# Patient Record
Sex: FEMALE | Race: BLACK OR AFRICAN AMERICAN | NOT HISPANIC OR LATINO | Employment: STUDENT | URBAN - METROPOLITAN AREA
[De-identification: names, ages, dates, MRNs, and addresses within clinical notes are randomized per-mention and may not be internally consistent; named-entity substitution may affect disease eponyms.]

---
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
## 2024-05-01 ENCOUNTER — HOSPITAL ENCOUNTER (EMERGENCY)
Facility: HOSPITAL | Age: 19
Discharge: HOME | End: 2024-05-01
Attending: EMERGENCY MEDICINE
Payer: MEDICAID

## 2024-05-01 VITALS
OXYGEN SATURATION: 100 % | HEIGHT: 62 IN | TEMPERATURE: 99 F | HEART RATE: 93 BPM | BODY MASS INDEX: 28.52 KG/M2 | DIASTOLIC BLOOD PRESSURE: 93 MMHG | WEIGHT: 155 LBS | SYSTOLIC BLOOD PRESSURE: 147 MMHG | RESPIRATION RATE: 16 BRPM

## 2024-05-01 DIAGNOSIS — S91.332A PUNCTURE WOUND OF LEFT FOOT, INITIAL ENCOUNTER: Primary | ICD-10-CM

## 2024-05-01 LAB
B-HCG UR QL: NEGATIVE
CTP QC/QA: YES

## 2024-05-01 PROCEDURE — 99283 EMERGENCY DEPT VISIT LOW MDM: CPT

## 2024-05-01 PROCEDURE — 81025 URINE PREGNANCY TEST: CPT

## 2024-05-01 RX ORDER — SULFAMETHOXAZOLE AND TRIMETHOPRIM 800; 160 MG/1; MG/1
1 TABLET ORAL 2 TIMES DAILY
Qty: 10 TABLET | Refills: 0 | Status: SHIPPED | OUTPATIENT
Start: 2024-05-01 | End: 2024-05-06

## 2024-05-01 RX ORDER — ACETAMINOPHEN 500 MG
1000 TABLET ORAL EVERY 6 HOURS PRN
Qty: 28 TABLET | Refills: 0 | Status: SHIPPED | OUTPATIENT
Start: 2024-05-01

## 2024-05-01 NOTE — DISCHARGE INSTRUCTIONS

## 2024-05-01 NOTE — ED PROVIDER NOTES
Encounter Date: 5/1/2024       History     Chief Complaint   Patient presents with    Foot Pain     Pt stated she step on a stick pin yesterday and is now having pain to her left foot. Pt stated it went deep into her foot and someone pulled it out.     Ana Sinha is a 18-year-old female with no pertinent past medical history who presents to the emergency department with a chief complaint of puncture wound to the left foot.  Yesterday, she was helping her boyfriend grandmother clean, and when she was walking through the yd, she stepped on a needle.  After clarification, the patient described a sewing pin.  This object was not a hypodermic needle.  Patient reporting mild pain with ambulation.  Denies any swelling, erythema, or warmth.  No drainage.  Per chart review, last tetanus 2022.     The history is provided by the patient. No  was used.     Review of patient's allergies indicates:   Allergen Reactions    Nut - unspecified Anaphylaxis    Peanut Anaphylaxis    Iodine     Shrimp      Past Medical History:   Diagnosis Date    Anxiety disorder, unspecified     Asthma     Bronchitis      Past Surgical History:   Procedure Laterality Date    orif leg       Family History   Problem Relation Name Age of Onset    No Known Problems Mother      Hypertension Father       Social History     Tobacco Use    Smoking status: Never     Passive exposure: Yes    Smokeless tobacco: Never   Substance Use Topics    Alcohol use: No    Drug use: No     Review of Systems   Constitutional:  Negative for chills and fever.   HENT:  Negative for sore throat.    Respiratory:  Negative for shortness of breath.    Cardiovascular:  Negative for chest pain.   Gastrointestinal:  Negative for nausea.   Genitourinary:  Negative for dysuria.   Musculoskeletal:  Positive for arthralgias (Left foot). Negative for back pain and joint swelling.   Skin:  Positive for wound (Puncture puncture wound, left foot). Negative for rash.    Neurological:  Negative for weakness.   Hematological:  Does not bruise/bleed easily.       Physical Exam     Initial Vitals [05/01/24 1802]   BP Pulse Resp Temp SpO2   (!) 147/93 93 16 99.4 °F (37.4 °C) 100 %      MAP       --         Physical Exam    Nursing note and vitals reviewed.  Constitutional: Vital signs are normal. She appears well-developed and well-nourished. She is cooperative. She does not appear ill. No distress.   HENT:   Head: Normocephalic and atraumatic.   Right Ear: Hearing and external ear normal.   Left Ear: Hearing and external ear normal.   Nose: Nose normal.   Eyes: Conjunctivae and EOM are normal.   Neck: Phonation normal.   Normal range of motion.  Cardiovascular:  Normal rate and regular rhythm.           No murmur heard.  Pulmonary/Chest: Effort normal. No respiratory distress.   Abdominal: Abdomen is soft. She exhibits no distension. There is no abdominal tenderness.   Musculoskeletal:      Cervical back: Normal range of motion.        Feet:       Comments: Tiny puncture wound noted to the sole of the left foot.  No significant tenderness to palpation, erythema, warmth, drainage.  Neurovascularly intact distal.     Neurological: She is alert and oriented to person, place, and time. GCS eye subscore is 4. GCS verbal subscore is 5. GCS motor subscore is 6.   Skin: Skin is warm. Capillary refill takes less than 2 seconds.         ED Course   Procedures  Labs Reviewed   POCT URINE PREGNANCY          Imaging Results    None          Medications - No data to display  Medical Decision Making  18-year-old female presenting to the emergency department with a chief complaint of puncture wound to the left foot.  Sustained by a sewing pain in her boyfriend's grandmother's yd.  Still ambulatory.  Denies fever, systemic symptoms, erythema, warmth, drainage.  On physical exam, clinically well-appearing and in no acute distress.  Vitals within acceptable ranges.  Tiny puncture wound to the sole of  the left foot with no signs of localized infection.  Neurovascularly intact distal.  Minimally tender to palpation.    Differential diagnosis includes but is not limited to puncture wound with or without muscle, tendon, ligament, bone, or neurovascular damage, foreign body, or surrounding soft tissue infection such as cellulitis or erysipelas.    Puncture wound was sustained yesterday, it was mostly healed over at this time.  Patient states she cleans it at home, I do not believe it would be able to effectively irrigate it.  I will start her on a short course of Bactrim.  Her tetanus is up-to-date.  She was stable for discharge home at this time.  Motrin and Tylenol for pain control at home.    Return precautions were discussed, all patient questions were answered, and the patient was agreeable to the plan of care.  She was discharged home in stable condition and will follow up with her primary care provider or return to the emergency department if her symptoms worsen or do not improve.     Amount and/or Complexity of Data Reviewed  Labs: ordered. Decision-making details documented in ED Course.    Risk  OTC drugs.  Prescription drug management.  Diagnosis or treatment significantly limited by social determinants of health.                                      Clinical Impression:  Final diagnoses:  [S91.332A] Puncture wound of left foot, initial encounter (Primary)          ED Disposition Condition    Discharge Stable          ED Prescriptions       Medication Sig Dispense Start Date End Date Auth. Provider    sulfamethoxazole-trimethoprim 800-160mg (BACTRIM DS) 800-160 mg Tab Take 1 tablet by mouth 2 (two) times daily. for 5 days 10 tablet 5/1/2024 5/6/2024 Magen Asher, CHIVO    acetaminophen (TYLENOL) 500 MG tablet Take 2 tablets (1,000 mg total) by mouth every 6 (six) hours as needed. 28 tablet 5/1/2024 -- Magen Asher, CHIVO          Follow-up Information       Follow up With Specialties Details Why  Contact Info    Yuniel Reid MD Neonatology Schedule an appointment as soon as possible for a visit  As needed, If symptoms worsen [address] [place] [address]  [phone]               Magen Asher, PA-C  05/01/24 8278

## 2024-06-24 ENCOUNTER — HOSPITAL ENCOUNTER (EMERGENCY)
Facility: HOSPITAL | Age: 19
Discharge: HOME | End: 2024-06-24
Attending: EMERGENCY MEDICINE
Payer: MEDICAID

## 2024-06-24 VITALS
SYSTOLIC BLOOD PRESSURE: 122 MMHG | OXYGEN SATURATION: 100 % | DIASTOLIC BLOOD PRESSURE: 89 MMHG | BODY MASS INDEX: 27.6 KG/M2 | WEIGHT: 150 LBS | TEMPERATURE: 99 F | HEIGHT: 62 IN | HEART RATE: 79 BPM | RESPIRATION RATE: 18 BRPM

## 2024-06-24 DIAGNOSIS — L73.2 HIDRADENITIS SUPPURATIVA: Primary | ICD-10-CM

## 2024-06-24 LAB
B-HCG UR QL: NEGATIVE
CTP QC/QA: YES

## 2024-06-24 PROCEDURE — 96372 THER/PROPH/DIAG INJ SC/IM: CPT

## 2024-06-24 PROCEDURE — 25000003 PHARM REV CODE 250

## 2024-06-24 PROCEDURE — 63600175 PHARM REV CODE 636 W HCPCS

## 2024-06-24 PROCEDURE — 81025 URINE PREGNANCY TEST: CPT | Performed by: NURSE PRACTITIONER

## 2024-06-24 PROCEDURE — 25000003 PHARM REV CODE 250: Performed by: NURSE PRACTITIONER

## 2024-06-24 PROCEDURE — 10060 I&D ABSCESS SIMPLE/SINGLE: CPT

## 2024-06-24 PROCEDURE — 99284 EMERGENCY DEPT VISIT MOD MDM: CPT | Mod: 25

## 2024-06-24 RX ORDER — ACETAMINOPHEN 500 MG
1000 TABLET ORAL EVERY 6 HOURS PRN
Qty: 28 TABLET | Refills: 0 | Status: SHIPPED | OUTPATIENT
Start: 2024-06-24

## 2024-06-24 RX ORDER — MUPIROCIN 20 MG/G
1 OINTMENT TOPICAL
Status: COMPLETED | OUTPATIENT
Start: 2024-06-24 | End: 2024-06-24

## 2024-06-24 RX ORDER — MUPIROCIN 20 MG/G
OINTMENT TOPICAL 3 TIMES DAILY
Qty: 22 G | Refills: 0 | Status: SHIPPED | OUTPATIENT
Start: 2024-06-24

## 2024-06-24 RX ORDER — IBUPROFEN 800 MG/1
800 TABLET ORAL EVERY 6 HOURS PRN
Qty: 20 TABLET | Refills: 0 | Status: SHIPPED | OUTPATIENT
Start: 2024-06-24

## 2024-06-24 RX ORDER — KETOROLAC TROMETHAMINE 30 MG/ML
30 INJECTION, SOLUTION INTRAMUSCULAR; INTRAVENOUS
Status: COMPLETED | OUTPATIENT
Start: 2024-06-24 | End: 2024-06-24

## 2024-06-24 RX ORDER — SULFAMETHOXAZOLE AND TRIMETHOPRIM 800; 160 MG/1; MG/1
1 TABLET ORAL 2 TIMES DAILY
Qty: 14 TABLET | Refills: 0 | Status: SHIPPED | OUTPATIENT
Start: 2024-06-24 | End: 2024-07-01

## 2024-06-24 RX ORDER — LIDOCAINE HYDROCHLORIDE 10 MG/ML
10 INJECTION INFILTRATION; PERINEURAL
Status: COMPLETED | OUTPATIENT
Start: 2024-06-24 | End: 2024-06-24

## 2024-06-24 RX ADMIN — LIDOCAINE HYDROCHLORIDE 10 ML: 10 INJECTION, SOLUTION INFILTRATION; PERINEURAL at 04:06

## 2024-06-24 RX ADMIN — KETOROLAC TROMETHAMINE 30 MG: 30 INJECTION, SOLUTION INTRAMUSCULAR at 06:06

## 2024-06-24 RX ADMIN — MUPIROCIN 1 TUBE: 20 OINTMENT TOPICAL at 06:06

## 2024-06-24 NOTE — DISCHARGE INSTRUCTIONS

## 2024-06-24 NOTE — MEDICAL/APP STUDENT
"  History     Chief Complaint   Patient presents with    Abscess     Pt c/o "abscess" to right inner thigh x 3-4 days.  Denies drainage.  +swelling and pain, redness unknown.  Denies fever.      19 y/o F with Fhx of HS presents with worsening painful abscess without drainage on her right inner thigh x3 days. She has history of abscesses in the past which were treated with incision and drainage, antibiotics and pain medications. Patient denies fever, chills, N/V/D.     The history is provided by the patient. No  was used.       Past Medical History:   Diagnosis Date    Anxiety disorder, unspecified     Asthma     Bronchitis        Past Surgical History:   Procedure Laterality Date    orif leg         Family History   Problem Relation Name Age of Onset    No Known Problems Mother      Hypertension Father         Social History     Tobacco Use    Smoking status: Never     Passive exposure: Yes    Smokeless tobacco: Never   Substance Use Topics    Alcohol use: No    Drug use: No       Review of Systems   Constitutional:  Positive for fatigue. Negative for chills and fever.   HENT:  Negative for congestion, rhinorrhea and sore throat.    Respiratory:  Negative for cough, chest tightness and shortness of breath.    Cardiovascular:  Negative for chest pain and palpitations.   Gastrointestinal:  Negative for abdominal pain, constipation, diarrhea, nausea and vomiting.   Genitourinary:  Negative for dysuria.   Skin:  Positive for rash (groin) and wound (abscess).   Neurological:  Negative for dizziness, light-headedness and headaches.       Physical Exam   /60 (BP Location: Left arm, Patient Position: Sitting)   Pulse 76   Temp 99.5 °F (37.5 °C) (Oral)   Resp 18   Ht 5' 2" (1.575 m)   Wt 68 kg (150 lb)   SpO2 99%   Breastfeeding No   BMI 27.44 kg/m²     Physical Exam    Vitals reviewed.  Constitutional: She appears well-developed and well-nourished. No distress.   Cardiovascular:  Normal " rate, regular rhythm and normal heart sounds.     Exam reveals no gallop and no friction rub.       No murmur heard.  Pulmonary/Chest: Breath sounds normal. No respiratory distress. She has no wheezes. She has no rhonchi. She has no rales.   Abdominal: Abdomen is soft. Bowel sounds are normal. She exhibits no distension. There is no abdominal tenderness. There is no rebound and no guarding.   Genitourinary:    Genitourinary Comments: Abscess locations noted above           Neurological: She is alert.   Skin: Skin is warm and dry. Rash (groin) and abscess (x2) noted.         ED Course     MDM  17 y/o F with Fhx of HS presents with worsening painful abscess without drainage on her right inner thigh x3 days. She has history of abscesses in the past which were treated with incision and drainage, antibiotics and pain medications. She has x2 abscesses with central punctum on her right inner thigh. I&D performed for treatment of abscesses. Patient prescribed bactrim and topical mupirocin to prevent recurrence. Tylenol and ibuprofen sent out for pain control.       Ddx  Abscess, hidradenitis suppurativa, dejuan's gangrene, cellulitis

## 2024-06-24 NOTE — Clinical Note
"Ana Quevedomeghan Sinha was seen and treated in our emergency department on 6/24/2024.  She may return to work on 06/28/2024.       If you have any questions or concerns, please don't hesitate to call.      Magen Asher, CHIVO"

## 2024-06-25 PROCEDURE — 10060 I&D ABSCESS SIMPLE/SINGLE: CPT

## 2024-07-09 ENCOUNTER — OFFICE VISIT (OUTPATIENT)
Dept: SURGERY | Facility: CLINIC | Age: 19
End: 2024-07-09
Payer: MEDICAID

## 2024-07-09 VITALS
WEIGHT: 159.75 LBS | HEART RATE: 92 BPM | SYSTOLIC BLOOD PRESSURE: 138 MMHG | BODY MASS INDEX: 29.4 KG/M2 | DIASTOLIC BLOOD PRESSURE: 76 MMHG | HEIGHT: 62 IN

## 2024-07-09 DIAGNOSIS — L73.2 HIDRADENITIS SUPPURATIVA: Primary | ICD-10-CM

## 2024-07-09 PROCEDURE — 99204 OFFICE O/P NEW MOD 45 MIN: CPT | Mod: S$PBB,,, | Performed by: SURGERY

## 2024-07-09 PROCEDURE — 99999 PR PBB SHADOW E&M-EST. PATIENT-LVL IV: CPT | Mod: PBBFAC,,, | Performed by: SURGERY

## 2024-07-09 PROCEDURE — 3078F DIAST BP <80 MM HG: CPT | Mod: CPTII,,, | Performed by: SURGERY

## 2024-07-09 PROCEDURE — 1159F MED LIST DOCD IN RCRD: CPT | Mod: CPTII,,, | Performed by: SURGERY

## 2024-07-09 PROCEDURE — 3008F BODY MASS INDEX DOCD: CPT | Mod: CPTII,,, | Performed by: SURGERY

## 2024-07-09 PROCEDURE — 99214 OFFICE O/P EST MOD 30 MIN: CPT | Mod: PBBFAC | Performed by: SURGERY

## 2024-07-09 PROCEDURE — 3075F SYST BP GE 130 - 139MM HG: CPT | Mod: CPTII,,, | Performed by: SURGERY

## 2024-07-09 RX ORDER — DOXYCYCLINE 100 MG/1
100 CAPSULE ORAL EVERY 12 HOURS
Qty: 180 CAPSULE | Refills: 3 | Status: SHIPPED | OUTPATIENT
Start: 2024-07-09

## 2024-07-09 NOTE — PROGRESS NOTES
19 y/o woman with history of axillary and inguinal hydradenitis.    Discussed surgery and other treatment options including biologics    Will order suppressive doxy and refer to derm.

## 2024-07-10 ENCOUNTER — LAB VISIT (OUTPATIENT)
Dept: LAB | Facility: HOSPITAL | Age: 19
End: 2024-07-10
Attending: FAMILY MEDICINE
Payer: MEDICAID

## 2024-07-10 ENCOUNTER — OFFICE VISIT (OUTPATIENT)
Facility: CLINIC | Age: 19
End: 2024-07-10
Payer: MEDICAID

## 2024-07-10 VITALS
BODY MASS INDEX: 28.8 KG/M2 | TEMPERATURE: 98 F | WEIGHT: 156.5 LBS | SYSTOLIC BLOOD PRESSURE: 102 MMHG | RESPIRATION RATE: 18 BRPM | HEIGHT: 62 IN | OXYGEN SATURATION: 100 % | HEART RATE: 73 BPM | DIASTOLIC BLOOD PRESSURE: 64 MMHG

## 2024-07-10 DIAGNOSIS — Z02.0 ENCOUNTER FOR EXAMINATION FOR ADMISSION TO EDUCATIONAL INSTITUTION: ICD-10-CM

## 2024-07-10 DIAGNOSIS — Z02.0 ENCOUNTER FOR EXAMINATION FOR ADMISSION TO EDUCATIONAL INSTITUTION: Primary | ICD-10-CM

## 2024-07-10 LAB
B-HCG UR QL: NEGATIVE
CTP QC/QA: YES

## 2024-07-10 PROCEDURE — 86706 HEP B SURFACE ANTIBODY: CPT | Mod: 91 | Performed by: FAMILY MEDICINE

## 2024-07-10 PROCEDURE — 86735 MUMPS ANTIBODY: CPT | Performed by: FAMILY MEDICINE

## 2024-07-10 PROCEDURE — 99999 PR PBB SHADOW E&M-EST. PATIENT-LVL IV: CPT | Mod: PBBFAC,,, | Performed by: FAMILY MEDICINE

## 2024-07-10 PROCEDURE — 1159F MED LIST DOCD IN RCRD: CPT | Mod: CPTII,,, | Performed by: FAMILY MEDICINE

## 2024-07-10 PROCEDURE — 90620 MENB-4C VACCINE IM: CPT | Mod: PBBFAC,PN

## 2024-07-10 PROCEDURE — 86787 VARICELLA-ZOSTER ANTIBODY: CPT | Performed by: FAMILY MEDICINE

## 2024-07-10 PROCEDURE — 99999PBSHW POCT URINE PREGNANCY: Mod: PBBFAC,,,

## 2024-07-10 PROCEDURE — 81025 URINE PREGNANCY TEST: CPT | Mod: PBBFAC,PN | Performed by: FAMILY MEDICINE

## 2024-07-10 PROCEDURE — 99214 OFFICE O/P EST MOD 30 MIN: CPT | Mod: PBBFAC,PN,25 | Performed by: FAMILY MEDICINE

## 2024-07-10 PROCEDURE — 99999PBSHW PR PBB SHADOW TECHNICAL ONLY FILED TO HB: Mod: PBBFAC,,,

## 2024-07-10 PROCEDURE — 86765 RUBEOLA ANTIBODY: CPT | Performed by: FAMILY MEDICINE

## 2024-07-10 PROCEDURE — 3078F DIAST BP <80 MM HG: CPT | Mod: CPTII,,, | Performed by: FAMILY MEDICINE

## 2024-07-10 PROCEDURE — 1160F RVW MEDS BY RX/DR IN RCRD: CPT | Mod: CPTII,,, | Performed by: FAMILY MEDICINE

## 2024-07-10 PROCEDURE — 90471 IMMUNIZATION ADMIN: CPT | Mod: PBBFAC,PN

## 2024-07-10 PROCEDURE — 3008F BODY MASS INDEX DOCD: CPT | Mod: CPTII,,, | Performed by: FAMILY MEDICINE

## 2024-07-10 PROCEDURE — 99385 PREV VISIT NEW AGE 18-39: CPT | Mod: S$PBB,,, | Performed by: FAMILY MEDICINE

## 2024-07-10 PROCEDURE — 36415 COLL VENOUS BLD VENIPUNCTURE: CPT | Performed by: FAMILY MEDICINE

## 2024-07-10 PROCEDURE — 86762 RUBELLA ANTIBODY: CPT | Performed by: FAMILY MEDICINE

## 2024-07-10 PROCEDURE — 3074F SYST BP LT 130 MM HG: CPT | Mod: CPTII,,, | Performed by: FAMILY MEDICINE

## 2024-07-10 RX ADMIN — NEISSERIA MENINGITIDIS SEROGROUP B NHBA FUSION PROTEIN ANTIGEN, NEISSERIA MENINGITIDIS SEROGROUP B FHBP FUSION PROTEIN ANTIGEN AND NEISSERIA MENINGITIDIS SEROGROUP B NADA PROTEIN ANTIGEN 0.5 ML: 50; 50; 50; 25 INJECTION, SUSPENSION INTRAMUSCULAR at 03:07

## 2024-07-10 NOTE — PROGRESS NOTES
Subjective     Patient ID: Ana Sinha is a 18 y.o. female.    Chief Complaint: Establish Care (Pt stated she is coming in today for a physical for school and to establish care with a PCP)    HPI:  Patient is a 18 year old female with anxiety here for physical for nursing school and to establish care.   Review of Systems   All other systems reviewed and are negative.         Objective     Physical Exam  Constitutional:       Appearance: Normal appearance. She is well-developed.   HENT:      Head: Normocephalic and atraumatic.      Right Ear: External ear normal.      Left Ear: External ear normal.      Nose: Nose normal.   Eyes:      General: No scleral icterus.     Conjunctiva/sclera: Conjunctivae normal.      Pupils: Pupils are equal, round, and reactive to light.   Neck:      Thyroid: No thyromegaly.   Cardiovascular:      Rate and Rhythm: Normal rate and regular rhythm.      Heart sounds: No murmur heard.     No friction rub. No gallop.   Pulmonary:      Effort: Pulmonary effort is normal.      Breath sounds: Normal breath sounds. No wheezing or rales.   Abdominal:      General: Bowel sounds are normal. There is no distension.      Palpations: Abdomen is soft. There is no mass.      Tenderness: There is no abdominal tenderness.   Musculoskeletal:         General: No swelling, tenderness or deformity.      Cervical back: Normal range of motion and neck supple.      Right lower leg: No edema.      Left lower leg: No edema.   Lymphadenopathy:      Cervical: No cervical adenopathy.      Upper Body:      Right upper body: No supraclavicular adenopathy.      Left upper body: No supraclavicular adenopathy.   Skin:     General: Skin is warm and dry.      Findings: No rash.   Neurological:      Mental Status: She is alert and oriented to person, place, and time.      Gait: Gait normal.      Deep Tendon Reflexes: Reflexes normal.   Psychiatric:         Mood and Affect: Mood normal.         Behavior: Behavior  normal.          Assessment and Plan     1. Encounter for examination for admission to [facility]  Normal exam.  Will obtain titers and administer meningococcal vaccine  -     VARICELLA ZOSTER ANTIBODY, IGG; Future; Expected date: 07/10/2024  -     RUBELLA ANTIBODY, IGG; Future; Expected date: 07/10/2024  -     MUMPS ANTIBODY, IGG; Future; Expected date: 07/10/2024  -     RUBEOLA ANTIBODY IGG; Future; Expected date: 07/10/2024  -     Hepatitis B Surface Ab, Qualitative; Future; Expected date: 07/10/2024  -     QUANTIFERON GOLD TB; Future; Expected date: 07/10/2024  -     meningococcal group B vaccine (PF) injection 0.5 mL               No follow-ups on file.

## 2024-07-11 ENCOUNTER — LAB VISIT (OUTPATIENT)
Dept: LAB | Facility: HOSPITAL | Age: 19
End: 2024-07-11
Attending: FAMILY MEDICINE
Payer: MEDICAID

## 2024-07-11 DIAGNOSIS — Z02.0 ENCOUNTER FOR EXAMINATION FOR ADMISSION TO EDUCATIONAL INSTITUTION: ICD-10-CM

## 2024-07-11 LAB
HBV SURFACE AB SER-ACNC: <3 MIU/ML
HBV SURFACE AB SER-ACNC: NORMAL M[IU]/ML

## 2024-07-11 PROCEDURE — 86480 TB TEST CELL IMMUN MEASURE: CPT | Performed by: FAMILY MEDICINE

## 2024-07-12 LAB
GAMMA INTERFERON BACKGROUND BLD IA-ACNC: 0 IU/ML
M TB IFN-G CD4+ BCKGRND COR BLD-ACNC: 0 IU/ML
M TB IFN-G CD4+ BCKGRND COR BLD-ACNC: 0.03 IU/ML
MITOGEN IGNF BCKGRD COR BLD-ACNC: 10 IU/ML
RUBV IGG SER-ACNC: 20.6 IU/ML
RUBV IGG SER-IMP: REACTIVE
TB GOLD PLUS: NEGATIVE

## 2024-07-13 ENCOUNTER — HOSPITAL ENCOUNTER (EMERGENCY)
Facility: HOSPITAL | Age: 19
Discharge: HOME | End: 2024-07-13
Attending: EMERGENCY MEDICINE
Payer: MEDICAID

## 2024-07-13 VITALS
HEART RATE: 91 BPM | OXYGEN SATURATION: 99 % | TEMPERATURE: 98 F | HEIGHT: 62 IN | WEIGHT: 158 LBS | RESPIRATION RATE: 20 BRPM | SYSTOLIC BLOOD PRESSURE: 120 MMHG | BODY MASS INDEX: 29.08 KG/M2 | DIASTOLIC BLOOD PRESSURE: 79 MMHG

## 2024-07-13 DIAGNOSIS — Z87.2 HISTORY OF HIDRADENITIS SUPPURATIVA: ICD-10-CM

## 2024-07-13 DIAGNOSIS — N75.1 BARTHOLIN'S GLAND ABSCESS: Primary | ICD-10-CM

## 2024-07-13 LAB
B-HCG UR QL: NEGATIVE
BILIRUB UR QL STRIP: NEGATIVE
CLARITY UR: CLEAR
COLOR UR: YELLOW
CTP QC/QA: YES
GLUCOSE UR QL STRIP: NEGATIVE
HGB UR QL STRIP: NEGATIVE
KETONES UR QL STRIP: ABNORMAL
LEUKOCYTE ESTERASE UR QL STRIP: NEGATIVE
MUMPS IGG INTERPRETATION: POSITIVE
MUMPS IGG SCREEN: 211 AU/ML
NITRITE UR QL STRIP: NEGATIVE
PH UR STRIP: 7 [PH] (ref 5–8)
PROT UR QL STRIP: ABNORMAL
RUBEOLA IGG ANTIBODY: >300 AU/ML
RUBEOLA INTERPRETATION: POSITIVE
SP GR UR STRIP: >1.03 (ref 1–1.03)
URN SPEC COLLECT METH UR: ABNORMAL
UROBILINOGEN UR STRIP-ACNC: ABNORMAL EU/DL
VARICELLA INTERPRETATION: POSITIVE
VARICELLA ZOSTER IGG: 545.2

## 2024-07-13 PROCEDURE — 25000003 PHARM REV CODE 250

## 2024-07-13 PROCEDURE — 56420 I&D BARTHOLINS GLAND ABSCESS: CPT

## 2024-07-13 PROCEDURE — 99283 EMERGENCY DEPT VISIT LOW MDM: CPT

## 2024-07-13 PROCEDURE — 81003 URINALYSIS AUTO W/O SCOPE: CPT

## 2024-07-13 PROCEDURE — 81025 URINE PREGNANCY TEST: CPT | Performed by: EMERGENCY MEDICINE

## 2024-07-13 PROCEDURE — 87591 N.GONORRHOEAE DNA AMP PROB: CPT

## 2024-07-13 PROCEDURE — 87491 CHLMYD TRACH DNA AMP PROBE: CPT

## 2024-07-13 RX ORDER — NAPROXEN 500 MG/1
500 TABLET ORAL 2 TIMES DAILY
Qty: 10 TABLET | Refills: 0 | Status: SHIPPED | OUTPATIENT
Start: 2024-07-13 | End: 2024-07-18

## 2024-07-13 RX ORDER — ONDANSETRON 4 MG/1
4 TABLET, ORALLY DISINTEGRATING ORAL
Status: COMPLETED | OUTPATIENT
Start: 2024-07-13 | End: 2024-07-13

## 2024-07-13 RX ORDER — OXYCODONE AND ACETAMINOPHEN 5; 325 MG/1; MG/1
1 TABLET ORAL
Status: COMPLETED | OUTPATIENT
Start: 2024-07-13 | End: 2024-07-13

## 2024-07-13 RX ORDER — LIDOCAINE HYDROCHLORIDE 10 MG/ML
100 INJECTION INFILTRATION; PERINEURAL
Status: COMPLETED | OUTPATIENT
Start: 2024-07-13 | End: 2024-07-13

## 2024-07-13 RX ADMIN — ONDANSETRON 4 MG: 4 TABLET, ORALLY DISINTEGRATING ORAL at 02:07

## 2024-07-13 RX ADMIN — LIDOCAINE HYDROCHLORIDE 10 MG: 10 INJECTION, SOLUTION INFILTRATION; PERINEURAL at 02:07

## 2024-07-13 RX ADMIN — OXYCODONE HYDROCHLORIDE AND ACETAMINOPHEN 1 TABLET: 5; 325 TABLET ORAL at 02:07

## 2024-07-13 NOTE — DISCHARGE INSTRUCTIONS
You were seen in the emergency department today for abscess.  Please take all medications as prescribed and as we discussed.  Follow-up with specialist if instructed to do so.  It is important to remember that some problems are difficult to diagnose and may not be found during your Emergency Department visit. Be sure to follow up with your primary care doctor and review all labs/imaging/tests that were performed during this visit with them. Some labs/tests may be outside of the normal range and require non-emergent follow-up and further investigation to help diagnose/exclude/prevent complications or other medical conditions. Return to the emergency department for any new or worsening symptoms. Thank you for allowing me to care for you today, it was my pleasure. I hope you get to feeling better soon!

## 2024-07-13 NOTE — ED NOTES
Pt c/o abscess to left upper inner thigh near buttocks.  Denies fever or drainage.  Pt is AAOx3, resp even and unlabored, skin warm and dry. Visitor at bedside.

## 2024-07-13 NOTE — ED PROVIDER NOTES
Encounter Date: 7/13/2024    SCRIBE #1 NOTE: I, Glory Smith, am scribing for, and in the presence of,  Mitchell Marvin PA-C. I have scribed the following portions of the note - Other sections scribed: HPI/ROS/PE.       History     Chief Complaint   Patient presents with    Abscess     Pt to ED reporting abscess in between buttock towards groin. Pt denies fevers. Pt is currently on antibiotics for another abscess that has healed.      Patient is a 18 y.o. female with a past medical history of anxiety, asthma, hidradenitis suppurativa who presents to the emergency department with painful left inguinal groin abscess x 3 days. Patient also reports dysuria. Pt was in ED 2 weeks ago with abscess, it was drained and pt discharged with Bactrim.   Pt reports abscess from 2 weeks ago has healed. The current abscess is the same location but opposite side. Pt notes seeing general surgery on Tuesday and prescribed doxycycline; current abscess wasn't present at this time. LMP 1 month ago. Denies abscess drainage, fever, nausea, vomiting, vaginal discharge or other associated symptoms.      The history is provided by the patient.     Review of patient's allergies indicates:   Allergen Reactions    Nut - unspecified Anaphylaxis    Peanut Anaphylaxis    Iodine     Shrimp      Past Medical History:   Diagnosis Date    Anxiety disorder, unspecified     Asthma     Bronchitis      Past Surgical History:   Procedure Laterality Date    orif leg       Family History   Problem Relation Name Age of Onset    No Known Problems Mother      Hypertension Father       Social History     Tobacco Use    Smoking status: Never     Passive exposure: Yes    Smokeless tobacco: Never   Substance Use Topics    Alcohol use: No    Drug use: No     Review of Systems   Constitutional:  Negative for chills and fever.   Respiratory:  Negative for shortness of breath.    Cardiovascular:  Negative for chest pain.   Gastrointestinal:  Negative for abdominal pain,  nausea and vomiting.   Genitourinary:  Positive for dysuria. Negative for flank pain, frequency, hematuria, vaginal bleeding and vaginal discharge.        Positive for left inguinal groin abscess, no drainage.   Musculoskeletal:  Negative for back pain, neck pain and neck stiffness.   Neurological:  Negative for dizziness, light-headedness and headaches.       Physical Exam     Initial Vitals [07/13/24 1244]   BP Pulse Resp Temp SpO2   120/79 91 18 98.2 °F (36.8 °C) 99 %      MAP       --         Physical Exam    Nursing note and vitals reviewed.  Constitutional: She appears well-developed and well-nourished.   HENT:   Head: Normocephalic and atraumatic.   Right Ear: External ear normal.   Left Ear: External ear normal.   Neck: Carotid bruit is not present.   Normal range of motion.  Cardiovascular:  Normal rate, regular rhythm, normal heart sounds and intact distal pulses.     Exam reveals no gallop and no friction rub.       No murmur heard.  Pulmonary/Chest: Breath sounds normal. No respiratory distress. She has no wheezes. She has no rhonchi. She has no rales.   Abdominal: Abdomen is soft. Bowel sounds are normal. She exhibits no distension. There is no abdominal tenderness. There is no rebound and no guarding.   Genitourinary:    Genitourinary Comments: There is an area of swelling to the left inferior labia with erythema fluctuance consistent with Bartholin's gland abscess.  No active drainage.  Well healed scar to right groin from previous abscess.     Musculoskeletal:         General: Normal range of motion.      Cervical back: Normal range of motion.     Neurological: She is alert and oriented to person, place, and time. GCS score is 15. GCS eye subscore is 4. GCS verbal subscore is 5. GCS motor subscore is 6.   Psychiatric: She has a normal mood and affect.         ED Course   I & D - Incision and Drainage    Date/Time: 7/13/2024 3:36 PM  Location procedure was performed: [facility] EMERGENCY  DEPARTMENT    Performed by: Mitchell Marvin PA-C  Authorized by: Thony Aviles MD  Consent Done: Yes  Consent given by: patient  Patient identity confirmed:  and verbally with patient  Type: abscess  Body area: anogenital  Location details: Bartholin's gland  Anesthesia: local infiltration    Anesthesia:  Local Anesthetic: lidocaine 1% without epinephrine  Anesthetic total: 3 mL  Scalpel size: 11  Incision type: single straight  Drainage: purulent  Drainage amount: moderate  Wound treatment: incision, drainage, expression of material and wound left open  Complications: No  Specimens: No  Implants: No  Patient tolerance: Patient tolerated the procedure well with no immediate complications        Labs Reviewed   URINALYSIS, REFLEX TO URINE CULTURE - Abnormal; Notable for the following components:       Result Value    Specific Gravity, UA >1.030 (*)     Protein, UA Trace (*)     Ketones, UA 2+ (*)     Urobilinogen, UA 2.0-3.0 (*)     All other components within normal limits    Narrative:     Specimen Source->Urine   C. TRACHOMATIS/N. GONORRHOEAE BY AMP DNA   POCT URINE PREGNANCY          Imaging Results    None          Medications   LIDOcaine HCL 10 mg/ml (1%) injection 100 mg (10 mg Intradermal Given 24 1422)   oxyCODONE-acetaminophen 5-325 mg per tablet 1 tablet (1 tablet Oral Given 24 1434)   ondansetron disintegrating tablet 4 mg (4 mg Oral Given 24 1434)     Medical Decision Making  This is an emergent evaluation of a 18 y.o. female with a past medical history of anxiety, asthma, hidradenitis suppurativa who presents to the emergency department for evaluation of possible abscess to left groin x 3 days.    Patient looks well clinically. There is an area of swelling to the left inferior labia with erythema fluctuance consistent with Bartholin's gland abscess.  No active drainage.  Well healed scar to right groin from previous abscess. Regular rate rhythm without murmurs.  No carotid bruits  appreciated on exam. Lungs are clear to auscultation bilaterally.  Abdomen is soft, nontender, non distended, with normal bowel sounds.     Differential diagnosis includes but is not limited to abscess, cellulitis, UTI, STI, other infection.    Workup initiated with urinalysis, GC, UPT.  Ordered lidocaine, I&D kit.  Vital signs, chart, labs, and/or imaging were all reviewed.  See ED course below and interpretations above. My overall impression is bothering gland abscess. Will discharge home with dermatology and General surgery follow-up.  Patient already on doxycycline. Patient is very well appearing, and in no acute distress. Vital signs are reassuring here in the emergency department, patient is afebrile, breathing comfortable, satting 99 % on room air. Patient/Caregiver is stable for discharge at this time.  Patient/Caregiver was informed of results and plan of care. Patient/Caregiver verbalized understanding of care plan. All questions and concerns were addressed. Discussed strict return precautions with the patient/caregiver. Instructed follow up with primary care provider within 1 week.      Mitchell Marvin PA-C    DISCLAIMER: This note was prepared with [employer] voice recognition transcription software. Garbled syntax, mangled pronouns, and other bizarre constructions may be attributed to that software system.       Amount and/or Complexity of Data Reviewed  Labs: ordered. Decision-making details documented in ED Course.    Risk  Prescription drug management.            Scribe Attestation:   Scribe #1: I performed the above scribed service and the documentation accurately describes the services I performed. I attest to the accuracy of the note.        ED Course as of 07/13/24 1537   Sat Jul 13, 2024   1402 POCT urine pregnancy  UPT negative. [TM]   1431 Patient tearful. Not able to get her to calm down for I&D. Ordered 1 percocet for pain and zofran to prevent nausea. [TM]   1511 Urinalysis, Reflex to Urine  Culture Urine, Clean Catch(!)  Urinalysis shows no signs of infection, negative for nitrites or leukocytes. [TM]   1511 Incision and drainage performed with moderate purulent drainage.  Patient tolerated procedure well.  Patient currently on doxycycline for HS.  She follows with general surgery.  Will have her follow-up.  Will also placed referral to Dermatology.  Will discharge home with naproxen for pain. [TM]      ED Course User Index  [TM] Mitchell Marvin PA-C                           Clinical Impression:  Final diagnoses:  [N75.1] Bartholin's gland abscess (Primary)  [Z87.2] History of hidradenitis suppurativa          ED Disposition Condition    Discharge Stable          ED Prescriptions       Medication Sig Dispense Start Date End Date Auth. Provider    naproxen (NAPROSYN) 500 MG tablet Take 1 tablet (500 mg total) by mouth 2 (two) times daily. for 5 days 10 tablet 7/13/2024 7/18/2024 Mitchell Marvin PA-C          Follow-up Information       Follow up With Specialties Details Why Contact [facility] Emergency Dept Emergency Medicine Go to  As needed, If symptoms worsen, or new symptoms develop [address] [facility] [place] [address] [phone]    Primary care doctor  Schedule an appointment as soon as possible for a visit in 3 days               Mitchell Marvin PA-C  07/13/24 7041

## 2024-07-13 NOTE — Clinical Note
"Ana"Minesh Sinha was seen and treated in our emergency department on 7/13/2024.  She may return to work on 07/16/2024.       If you have any questions or concerns, please don't hesitate to call.      Mitchell Marvin PA-C"

## 2024-07-17 LAB
C TRACH DNA SPEC QL NAA+PROBE: NOT DETECTED
N GONORRHOEA DNA SPEC QL NAA+PROBE: NOT DETECTED

## 2024-08-12 ENCOUNTER — HOSPITAL ENCOUNTER (EMERGENCY)
Facility: HOSPITAL | Age: 19
Discharge: HOME | End: 2024-08-12
Attending: EMERGENCY MEDICINE
Payer: MEDICAID

## 2024-08-12 VITALS
TEMPERATURE: 98 F | OXYGEN SATURATION: 99 % | DIASTOLIC BLOOD PRESSURE: 74 MMHG | HEIGHT: 62 IN | RESPIRATION RATE: 16 BRPM | BODY MASS INDEX: 27.6 KG/M2 | WEIGHT: 150 LBS | SYSTOLIC BLOOD PRESSURE: 125 MMHG | HEART RATE: 75 BPM

## 2024-08-12 DIAGNOSIS — N39.0 URINARY TRACT INFECTION WITH PYURIA: ICD-10-CM

## 2024-08-12 DIAGNOSIS — B37.31 VAGINAL CANDIDIASIS: ICD-10-CM

## 2024-08-12 DIAGNOSIS — N76.0 BV (BACTERIAL VAGINOSIS): Primary | ICD-10-CM

## 2024-08-12 DIAGNOSIS — B96.89 BV (BACTERIAL VAGINOSIS): Primary | ICD-10-CM

## 2024-08-12 LAB
B-HCG UR QL: NEGATIVE
BACTERIA #/AREA URNS AUTO: ABNORMAL /HPF
BACTERIA GENITAL QL WET PREP: ABNORMAL
BILIRUB UR QL STRIP: NEGATIVE
C TRACH DNA SPEC QL NAA+PROBE: NOT DETECTED
CLARITY UR REFRACT.AUTO: ABNORMAL
CLUE CELLS VAG QL WET PREP: ABNORMAL
COLOR UR AUTO: YELLOW
CTP QC/QA: YES
FILAMENT FUNGI VAG WET PREP-#/AREA: ABNORMAL
GLUCOSE UR QL STRIP: NEGATIVE
HGB UR QL STRIP: NEGATIVE
KETONES UR QL STRIP: ABNORMAL
LEUKOCYTE ESTERASE UR QL STRIP: ABNORMAL
MICROSCOPIC COMMENT: ABNORMAL
N GONORRHOEA DNA SPEC QL NAA+PROBE: NOT DETECTED
NITRITE UR QL STRIP: NEGATIVE
PH UR STRIP: 7 [PH] (ref 5–8)
PROT UR QL STRIP: ABNORMAL
RBC #/AREA URNS AUTO: 1 /HPF (ref 0–4)
SP GR UR STRIP: 1.02 (ref 1–1.03)
SPECIMEN SOURCE: ABNORMAL
T VAGINALIS GENITAL QL WET PREP: ABNORMAL
URN SPEC COLLECT METH UR: ABNORMAL
UROBILINOGEN UR STRIP-ACNC: NEGATIVE EU/DL
WBC #/AREA URNS AUTO: 30 /HPF (ref 0–5)
WBC #/AREA VAG WET PREP: ABNORMAL
WBC CLUMPS UR QL AUTO: ABNORMAL
YEAST GENITAL QL WET PREP: ABNORMAL
YEAST UR QL AUTO: ABNORMAL

## 2024-08-12 PROCEDURE — 87591 N.GONORRHOEAE DNA AMP PROB: CPT | Mod: ER

## 2024-08-12 PROCEDURE — 81025 URINE PREGNANCY TEST: CPT | Mod: ER

## 2024-08-12 PROCEDURE — 87147 CULTURE TYPE IMMUNOLOGIC: CPT | Mod: ER

## 2024-08-12 PROCEDURE — 87088 URINE BACTERIA CULTURE: CPT | Mod: ER

## 2024-08-12 PROCEDURE — 81000 URINALYSIS NONAUTO W/SCOPE: CPT | Mod: ER

## 2024-08-12 PROCEDURE — 87086 URINE CULTURE/COLONY COUNT: CPT | Mod: ER

## 2024-08-12 PROCEDURE — 87210 SMEAR WET MOUNT SALINE/INK: CPT | Mod: ER

## 2024-08-12 PROCEDURE — 99284 EMERGENCY DEPT VISIT MOD MDM: CPT | Mod: ER

## 2024-08-12 PROCEDURE — 87491 CHLMYD TRACH DNA AMP PROBE: CPT | Mod: ER

## 2024-08-12 RX ORDER — METRONIDAZOLE 500 MG/1
500 TABLET ORAL EVERY 12 HOURS
Qty: 14 TABLET | Refills: 0 | Status: SHIPPED | OUTPATIENT
Start: 2024-08-12 | End: 2024-08-19

## 2024-08-12 RX ORDER — FLUCONAZOLE 200 MG/1
200 TABLET ORAL ONCE
Qty: 3 TABLET | Refills: 0 | Status: SHIPPED | OUTPATIENT
Start: 2024-08-12 | End: 2024-08-12

## 2024-08-12 RX ORDER — CEPHALEXIN 500 MG/1
500 CAPSULE ORAL 4 TIMES DAILY
Qty: 20 CAPSULE | Refills: 0 | Status: SHIPPED | OUTPATIENT
Start: 2024-08-12 | End: 2024-08-17

## 2024-08-12 NOTE — ED PROVIDER NOTES
Encounter Date: 8/12/2024       History     Chief Complaint   Patient presents with    Vaginal Discharge     White discharge, itching, burning sensation, started 1 week ago      Ana Sinha is a 18 y.o. female who has a past medical history of Anxiety disorder, unspecified, Asthma, and Bronchitis. presenting to the Emergency Department for vaginal discharge and dysuria. She reports symptoms have been going on for about a week. The vaginal discharge is thick and white. She does have some itching and irritation. Also reports dysuria, but feels it is not consistent with UTIs she has had in the past. No suprapubic pressure or ferquency. No abdominal pain, fever, flank pain, fevers. She reports low suspicion for STD. She does take doxycycline daily for HS.         The history is provided by the patient.     Review of patient's allergies indicates:   Allergen Reactions    Nut - unspecified Anaphylaxis    Peanut Anaphylaxis    Iodine     Shrimp      Past Medical History:   Diagnosis Date    Anxiety disorder, unspecified     Asthma     Bronchitis      Past Surgical History:   Procedure Laterality Date    orif leg       Family History   Problem Relation Name Age of Onset    No Known Problems Mother      Hypertension Father       Social History     Tobacco Use    Smoking status: Never     Passive exposure: Yes    Smokeless tobacco: Never   Substance Use Topics    Alcohol use: No    Drug use: No     Review of Systems   Constitutional:  Negative for chills and fever.   Gastrointestinal:  Negative for nausea and vomiting.   Genitourinary:  Positive for dysuria and vaginal discharge. Negative for flank pain and vaginal bleeding.       Physical Exam     Initial Vitals [08/12/24 1122]   BP Pulse Resp Temp SpO2   125/74 75 16 98.3 °F (36.8 °C) 99 %      MAP       --         Physical Exam    Nursing note and vitals reviewed.  Constitutional: She appears well-developed and well-nourished. She is not diaphoretic.  Non-toxic  appearance. No distress.   HENT:   Head: Normocephalic and atraumatic.   Right Ear: Hearing and external ear normal.   Left Ear: Hearing and external ear normal.   Eyes: EOM are normal.   Neck: Neck supple.   Normal range of motion.  Cardiovascular:  Normal rate.           Pulmonary/Chest: No respiratory distress.   Abdominal: Abdomen is soft. She exhibits no distension. There is no abdominal tenderness.   No CVA tenderness There is no rebound.   Musculoskeletal:         General: Normal range of motion.      Cervical back: Normal range of motion and neck supple. Normal range of motion.     Neurological: She is alert and oriented to person, place, and time. GCS score is 15. GCS eye subscore is 4. GCS verbal subscore is 5. GCS motor subscore is 6.   Skin: Skin is warm and dry.   Psychiatric: She has a normal mood and affect. Her behavior is normal. Judgment and thought content normal.         ED Course   Procedures  Labs Reviewed   VAGINAL SCREEN - Abnormal       Result Value    Trichomonas None      Clue Cells Rare (*)     Budding Yeast Occasional (*)     Fungal Hyphae None      WBC - Vaginal Screen Few (*)     Bacteria - Vaginal Screen Moderate (*)     Wet Prep Source VAG      Narrative:     Specimen Source->Vagina  Release to patient->Immediate   URINALYSIS, REFLEX TO URINE CULTURE - Abnormal    Specimen UA Urine, Clean Catch      Color, UA Yellow      Appearance, UA Cloudy (*)     pH, UA 7.0      Specific Gravity, UA 1.020      Protein, UA Trace (*)     Glucose, UA Negative      Ketones, UA Trace (*)     Bilirubin (UA) Negative      Occult Blood UA Negative      Nitrite, UA Negative      Urobilinogen, UA Negative      Leukocytes, UA 2+ (*)     Narrative:     Preferred Collection Type->Urine, Clean Catch  Specimen Source->Urine   URINALYSIS MICROSCOPIC - Abnormal    RBC, UA 1      WBC, UA 30 (*)     WBC Clumps, UA Moderate (*)     Bacteria Moderate (*)     Yeast, UA Occasional (*)     Microscopic Comment SEE  COMMENT      Narrative:     Preferred Collection Type->Urine, Clean Catch  Specimen Source->Urine   CULTURE, URINE   C. TRACHOMATIS/N. GONORRHOEAE BY AMP DNA   POCT URINE PREGNANCY    POC Preg Test, Ur Negative       Acceptable Yes            Imaging Results    None          Medications - No data to display  Medical Decision Making  18-year-old female presents with vaginal discharge and dysuria for 1 week. No fever, flank pain, abdominal pain. Low concern for STD    Differential Diagnosis includes, but is not limited to:  UTI/pyelonephritis, kidney stone, urinary retention, urethritis, appendicitis, prostatitis, testicular torsion/mass, incarcerated/strangulated hernia, yeast infection, BV, STD    Vaginal screen positive for rare clue cells and yeast. She does have 2+ leukocytes, pyuria, moderate bacteria consistent with UTI. Will treat patient for UTI, BV, yeast vaginitis. Recommend taking dose of fluconazole at the completion of antibiotics to prevent recurrence, however patient is on daily doxycycline. No systemic symptoms. Not septic. Well appearing. Low suspicion for acute pyelonephritis given lack of fever, CVAT, or systemic features. Patient advised to closely follow-up with PCP to ensure resolution. Return to the ED for reevaluation should symptoms worsen, return, or change in character. Patient is agreeable to this plan and all questions answered             Problems Addressed:  BV (bacterial vaginosis): acute illness or injury  Urinary tract infection with pyuria: acute illness or injury  Vaginal candidiasis: acute illness or injury    Amount and/or Complexity of Data Reviewed  Labs: ordered. Decision-making details documented in ED Course.    Risk  Prescription drug management.               ED Course as of 08/12/24 1237   Mon Aug 12, 2024   1149 hCG Qualitative, Urine: Negative [CS]      ED Course User Index  [CS] Glory Vaughan, CHIVO                           Clinical  Impression:  Final diagnoses:  [N76.0, B96.89] BV (bacterial vaginosis) (Primary)  [B37.31] Vaginal candidiasis  [N39.0] Urinary tract infection with pyuria          ED Disposition Condition    Discharge Stable          ED Prescriptions       Medication Sig Dispense Start Date End Date Auth. Provider    cephALEXin (KEFLEX) 500 MG capsule Take 1 capsule (500 mg total) by mouth 4 (four) times daily. for 5 days 20 capsule 8/12/2024 8/17/2024 Glory Vaughan PA-C    metroNIDAZOLE (FLAGYL) 500 MG tablet Take 1 tablet (500 mg total) by mouth every 12 (twelve) hours. for 7 days 14 tablet 8/12/2024 8/19/2024 Glory Vaughan PA-C    fluconazole (DIFLUCAN) 200 MG Tab (Expires today) Take 1 tablet (200 mg total) by mouth once. Repeat in 72 hours if still having symptoms. for 1 dose 3 tablet 8/12/2024 8/12/2024 Glory Vaughan PA-C          Follow-up Information       Follow up With Specialties Details Why Contact Info    Yuniel Reid MD Neonatology, Pediatrics Schedule an appointment as soon as possible for a visit in 1 week  [address] [facility] [address] [place] [address]  [phone]               Glory Vaughan PA-C  08/12/24 0991

## 2024-08-12 NOTE — Clinical Note
"Ana Davissilvano Sinha was seen and treated in our emergency department on 8/12/2024.  She may return to school on 08/13/2024.      If you have any questions or concerns, please don't hesitate to call.      Hilary MYLES RN"

## 2024-08-12 NOTE — DISCHARGE INSTRUCTIONS
Cephalexin or keflex is for Urinary Tract Infection  Metronidazole is for Bacterial Vaginosis  Fluconazole/Diflucan is for Yeast infection. Repeat the dose in 72 hours if symptoms are not improved.    You can hold the antibiotics for the UTI for two days to see if symptoms are improved. A urine culture is pending. I will call you for a positive urine culture.     Follow up with your primary care doctor to ensure repeat urine and vaginal test is negative.

## 2024-08-13 LAB — BACTERIA UR CULT: ABNORMAL

## 2024-09-09 ENCOUNTER — HOSPITAL ENCOUNTER (EMERGENCY)
Facility: HOSPITAL | Age: 19
Discharge: HOME | End: 2024-09-09
Attending: EMERGENCY MEDICINE
Payer: MEDICAID

## 2024-09-09 VITALS
SYSTOLIC BLOOD PRESSURE: 113 MMHG | RESPIRATION RATE: 17 BRPM | OXYGEN SATURATION: 98 % | WEIGHT: 150 LBS | HEIGHT: 62 IN | BODY MASS INDEX: 27.6 KG/M2 | DIASTOLIC BLOOD PRESSURE: 67 MMHG | HEART RATE: 20 BPM | TEMPERATURE: 98 F

## 2024-09-09 DIAGNOSIS — L03.116 CELLULITIS OF LEFT LOWER EXTREMITY: Primary | ICD-10-CM

## 2024-09-09 DIAGNOSIS — Z87.2 HISTORY OF HIDRADENITIS SUPPURATIVA: ICD-10-CM

## 2024-09-09 LAB
B-HCG UR QL: NEGATIVE
CTP QC/QA: YES

## 2024-09-09 PROCEDURE — 81025 URINE PREGNANCY TEST: CPT | Performed by: EMERGENCY MEDICINE

## 2024-09-09 PROCEDURE — 99283 EMERGENCY DEPT VISIT LOW MDM: CPT

## 2024-09-09 PROCEDURE — 25000003 PHARM REV CODE 250: Performed by: PHYSICIAN ASSISTANT

## 2024-09-09 RX ORDER — DOXYCYCLINE HYCLATE 100 MG
100 TABLET ORAL
Status: COMPLETED | OUTPATIENT
Start: 2024-09-09 | End: 2024-09-09

## 2024-09-09 RX ORDER — LIDOCAINE HYDROCHLORIDE 10 MG/ML
10 INJECTION, SOLUTION INFILTRATION; PERINEURAL
Status: COMPLETED | OUTPATIENT
Start: 2024-09-09 | End: 2024-09-09

## 2024-09-09 RX ORDER — IBUPROFEN 600 MG/1
600 TABLET ORAL EVERY 6 HOURS PRN
Qty: 20 TABLET | Refills: 0 | Status: SHIPPED | OUTPATIENT
Start: 2024-09-09 | End: 2024-09-14

## 2024-09-09 RX ORDER — OXYCODONE AND ACETAMINOPHEN 5; 325 MG/1; MG/1
1 TABLET ORAL
Status: COMPLETED | OUTPATIENT
Start: 2024-09-09 | End: 2024-09-09

## 2024-09-09 RX ORDER — ACETAMINOPHEN 500 MG
500 TABLET ORAL EVERY 4 HOURS PRN
Qty: 20 TABLET | Refills: 0 | Status: SHIPPED | OUTPATIENT
Start: 2024-09-09 | End: 2024-09-14

## 2024-09-09 RX ORDER — DOXYCYCLINE 100 MG/1
100 CAPSULE ORAL EVERY 12 HOURS
Qty: 14 CAPSULE | Refills: 0 | Status: SHIPPED | OUTPATIENT
Start: 2024-09-09 | End: 2024-09-16

## 2024-09-09 RX ADMIN — LIDOCAINE HYDROCHLORIDE 10 ML: 10 INJECTION, SOLUTION INFILTRATION; PERINEURAL at 11:09

## 2024-09-09 RX ADMIN — DOXYCYCLINE HYCLATE 100 MG: 100 TABLET, COATED ORAL at 12:09

## 2024-09-09 RX ADMIN — OXYCODONE HYDROCHLORIDE AND ACETAMINOPHEN 1 TABLET: 5; 325 TABLET ORAL at 12:09

## 2024-09-09 NOTE — Clinical Note
"Ana"Minesh Sinha was seen and treated in our emergency department on 9/9/2024.  She may return to work on 09/10/2024.       If you have any questions or concerns, please don't hesitate to call.      Alexia Alex PA-C"

## 2024-09-09 NOTE — DISCHARGE INSTRUCTIONS

## 2024-09-09 NOTE — Clinical Note
Cory Crawley accompanied their child to the emergency department on 9/9/2024. They may return to work on 09/10/2024.      If you have any questions or concerns, please don't hesitate to call.      Alexia Alex PA-C

## 2024-09-10 NOTE — ED PROVIDER NOTES
Encounter Date: 9/9/2024    SCRIBE #1 NOTE: IOsiris, am scribing for, and in the presence of,  Alexia Alex PA-C. I have scribed the following portions of the note - Other sections scribed: HPI, ROS.       History     Chief Complaint   Patient presents with    Abscess     Pt to ER with reports of abscess to left inner thigh x few days. Pt denies drainage.      Chief complaint: abscess    18 y.o. female with a history of hidradenitis supparativa presenting to the ED for pain and redness to left medial thigh for the past few days. Patient notes it feels similar to her HS. She reports it occasionally needs to be drained, but will sometimes resolve with antibiotics. Patient reports that she has an initial appoint with dermatology in November. No other exacerbating or alleviating factors. She denies any associated fever, chills, nausea, vomiting, bleeding, or drainage.      The history is provided by the patient. No  was used.     Review of patient's allergies indicates:   Allergen Reactions    Nut - unspecified Anaphylaxis    Peanut Anaphylaxis    Iodine     Shrimp      Past Medical History:   Diagnosis Date    Anxiety disorder, unspecified     Asthma     Bronchitis      Past Surgical History:   Procedure Laterality Date    orif leg       Family History   Problem Relation Name Age of Onset    No Known Problems Mother      Hypertension Father       Social History     Tobacco Use    Smoking status: Never     Passive exposure: Yes    Smokeless tobacco: Never   Substance Use Topics    Alcohol use: No    Drug use: No     Review of Systems   Constitutional:  Negative for chills and fever.   Gastrointestinal:  Negative for nausea and vomiting.   Skin:  Positive for wound (L medial thigh (-) bleeding (-) drainage).       Physical Exam     Initial Vitals [09/09/24 1114]   BP Pulse Resp Temp SpO2   113/67 (!) 20 20 98.2 °F (36.8 °C) 98 %      MAP       --         Physical Exam    Nursing  note and vitals reviewed.  Constitutional: Vital signs are normal. She appears well-developed and well-nourished.  Non-toxic appearance. She does not appear ill. No distress.   HENT:   Head: Normocephalic and atraumatic.   Right Ear: External ear normal.   Left Ear: External ear normal.   Mouth/Throat: Mucous membranes are normal. Mucous membranes are not dry.   Eyes: Conjunctivae and lids are normal. Right eye exhibits no discharge. Left eye exhibits no discharge. No scleral icterus.   Neck: Neck supple. No tracheal deviation present.   Normal range of motion.  Cardiovascular:  Normal rate.           Pulmonary/Chest: No stridor. No respiratory distress.   Musculoskeletal:         General: Normal range of motion.      Cervical back: Normal range of motion and neck supple.     Neurological: She is alert and oriented to person, place, and time.   Skin: Skin is warm, dry and intact. No rash noted. No erythema. No pallor.   Scattered areas of scarring to L thigh   1x1 cm area of erythema and tenderness. No fluctuance or induration     Psychiatric: She has a normal mood and affect. Her speech is normal and behavior is normal. Judgment and thought content normal.         ED Course   Procedures  Labs Reviewed   POCT URINE PREGNANCY       Result Value    POC Preg Test, Ur Negative       Acceptable Yes            Imaging Results    None          Medications   LIDOcaine HCL 10 mg/ml (1%) injection 10 mL (10 mLs Infiltration Given by Other 9/9/24 1150)   oxyCODONE-acetaminophen 5-325 mg per tablet 1 tablet (1 tablet Oral Given 9/9/24 1205)   doxycycline tablet 100 mg (100 mg Oral Given 9/9/24 1206)     Medical Decision Making  18-year-old female history of hidradenitis suppurativa presenting for evaluation of left medial thigh pain and swelling.  UPT negative.  Exam consistent with cellulitis.  No drainable abscess at this time.  Percocet p.o. and doxycycline 1st dose given in the ED.  Heart rate logged of 20 is  incorrect as patient has normal right in no distress.  Will have the patient follow up with her dermatologist.  Will have her return ER for worsening or as needed.  Follow up with primary care    Risk  OTC drugs.  Prescription drug management.            Scribe Attestation:   Scribe #1: I performed the above scribed service and the documentation accurately describes the services I performed. I attest to the accuracy of the note.                               Clinical Impression:  Final diagnoses:  [L03.116] Cellulitis of left lower extremity (Primary)  [Z87.2] History of hidradenitis suppurativa          ED Disposition Condition    Discharge Stable          ED Prescriptions       Medication Sig Dispense Start Date End Date Auth. Provider    ibuprofen (ADVIL,MOTRIN) 600 MG tablet Take 1 tablet (600 mg total) by mouth every 6 (six) hours as needed. 20 tablet 9/9/2024 9/14/2024 Alexia Alex PA-C    acetaminophen (TYLENOL) 500 MG tablet Take 1 tablet (500 mg total) by mouth every 4 (four) hours as needed. 20 tablet 9/9/2024 9/14/2024 Alexia Alex PA-C    doxycycline (VIBRAMYCIN) 100 MG Cap Take 1 capsule (100 mg total) by mouth every 12 (twelve) hours. for 7 days 14 capsule 9/9/2024 9/16/2024 Alexia Alex PA-C          Follow-up Information       Follow up With Specialties Details Why Contact Info Additional Information    [facility] - Dermatology [address] [place] [address] Dermatology Schedule an appointment as soon as possible for a visit in 2 days for follow up [address]  [facility] [address] [phone] Dermatology - Main Building, Clinic 11th Floor Please park in [facility]. Use Clinic elevators 12 & 13 to get to the 11th floor    [facility] - Emergency Dept Emergency Medicine Go to  As needed, If symptoms worsen [phone]  [facility] [place] [address] [phone]           Alexia SANTANA PA-C , personally performed the  services described in this documentation. All medical record entries made by the scribe were at my direction and in my presence. I have reviewed the chart and agree that the record reflects my personal performance and is accurate and complete.      DISCLAIMER: This note was prepared with [employer] voice recognition transcription software. Garbled syntax, mangled pronouns, and other bizarre constructions may be attributed to that software system.       Alexia Alex PA-C  09/10/24 0651

## 2024-09-16 ENCOUNTER — HOSPITAL ENCOUNTER (EMERGENCY)
Facility: HOSPITAL | Age: 19
Discharge: HOME | End: 2024-09-16
Attending: FAMILY MEDICINE
Payer: MEDICAID

## 2024-09-16 VITALS
HEART RATE: 82 BPM | RESPIRATION RATE: 16 BRPM | DIASTOLIC BLOOD PRESSURE: 78 MMHG | OXYGEN SATURATION: 98 % | SYSTOLIC BLOOD PRESSURE: 121 MMHG | WEIGHT: 150 LBS | BODY MASS INDEX: 27.6 KG/M2 | HEIGHT: 62 IN | TEMPERATURE: 98 F

## 2024-09-16 DIAGNOSIS — Z87.2 HISTORY OF HIDRADENITIS SUPPURATIVA: ICD-10-CM

## 2024-09-16 DIAGNOSIS — L02.91 ABSCESS: Primary | ICD-10-CM

## 2024-09-16 LAB
B-HCG UR QL: NEGATIVE
CTP QC/QA: YES

## 2024-09-16 PROCEDURE — 81025 URINE PREGNANCY TEST: CPT | Mod: ER | Performed by: PHYSICIAN ASSISTANT

## 2024-09-16 PROCEDURE — 99284 EMERGENCY DEPT VISIT MOD MDM: CPT | Mod: ER,25

## 2024-09-16 PROCEDURE — 25000003 PHARM REV CODE 250: Mod: ER | Performed by: PHYSICIAN ASSISTANT

## 2024-09-16 PROCEDURE — 10061 I&D ABSCESS COMP/MULTIPLE: CPT | Mod: ER

## 2024-09-16 RX ORDER — KETOROLAC TROMETHAMINE 10 MG/1
10 TABLET, FILM COATED ORAL EVERY 6 HOURS
Qty: 20 TABLET | Refills: 0 | Status: SHIPPED | OUTPATIENT
Start: 2024-09-16 | End: 2024-09-21

## 2024-09-16 RX ORDER — KETOROLAC TROMETHAMINE 10 MG/1
10 TABLET, FILM COATED ORAL
Status: COMPLETED | OUTPATIENT
Start: 2024-09-16 | End: 2024-09-16

## 2024-09-16 RX ORDER — LIDOCAINE HYDROCHLORIDE 10 MG/ML
10 INJECTION, SOLUTION EPIDURAL; INFILTRATION; INTRACAUDAL; PERINEURAL
Status: COMPLETED | OUTPATIENT
Start: 2024-09-16 | End: 2024-09-16

## 2024-09-16 RX ORDER — SULFAMETHOXAZOLE AND TRIMETHOPRIM 800; 160 MG/1; MG/1
1 TABLET ORAL 2 TIMES DAILY
Qty: 14 TABLET | Refills: 0 | Status: SHIPPED | OUTPATIENT
Start: 2024-09-16 | End: 2024-09-23

## 2024-09-16 RX ADMIN — KETOROLAC TROMETHAMINE 10 MG: 10 TABLET, FILM COATED ORAL at 10:09

## 2024-09-16 RX ADMIN — LIDOCAINE HYDROCHLORIDE 100 MG: 10 INJECTION, SOLUTION EPIDURAL; INFILTRATION; INTRACAUDAL; PERINEURAL at 09:09

## 2024-09-16 NOTE — ED PROVIDER NOTES
"Encounter Date: 9/16/2024       History     Chief Complaint   Patient presents with    Abscess     Abscess to right inner thigh     18-year-old female, [facility] hidradenitis support via, presents to ED with concern of abscess to right inner thigh.  Patient does report extensive history of reoccurring abscesses to axillary and groin regions.  She was seen at outside facility 1 week ago and prescribed doxycycline, which she admits she did not take because "it never works".  She reports abscess area has continued to grown in size and she is requesting I&D today.  She reports she does have appointment scheduled with dermatology but not until November.  No fevers, chills, body aches.  No other acute complaints at this time    The history is provided by the patient.     Review of patient's allergies indicates:   Allergen Reactions    Nut - unspecified Anaphylaxis    Peanut Anaphylaxis    Iodine     Shrimp      Past Medical History:   Diagnosis Date    Anxiety disorder, unspecified     Asthma     Bronchitis     Hidradenitis suppurativa      Past Surgical History:   Procedure Laterality Date    orif leg       Family History   Problem Relation Name Age of Onset    No Known Problems Mother      Hypertension Father       Social History     Tobacco Use    Smoking status: Never     Passive exposure: Yes    Smokeless tobacco: Never   Substance Use Topics    Alcohol use: No    Drug use: No     Review of Systems   Constitutional:  Positive for fever. Negative for chills.   Skin:  Positive for wound.       Physical Exam     Initial Vitals [09/16/24 0837]   BP Pulse Resp Temp SpO2   118/79 80 18 98.2 °F (36.8 °C) 99 %      MAP       --         Physical Exam    Vitals reviewed.  Constitutional: Vital signs are normal. She appears well-developed and well-nourished. She is cooperative. She does not have a sickly appearance. She does not appear ill. No distress.   HENT:   Head: Normocephalic and atraumatic.   Eyes: EOM are normal.   Neck: "   Normal range of motion.  Musculoskeletal:      Cervical back: Normal range of motion.     Neurological: She is alert and oriented to person, place, and time. GCS eye subscore is 4. GCS verbal subscore is 5. GCS motor subscore is 6.   Skin:   2.5-3 cm diameter fluctuant abscess noted to right inner/proximal thigh.  Surrounding scar tissue noted from previous abscess sites.  No drainage.  No warmth.   Psychiatric: She has a normal mood and affect. Her speech is normal and behavior is normal.         ED Course   I & D - Incision and Drainage    Date/Time: 9/16/2024 9:53 AM  Location procedure was performed: [facility] EMERGENCY DEPARTMENT    Performed by: Luis Miguel Luciano PA-C  Authorized by: Luis Miguel Luciano PA-C  Type: abscess  Body area: lower extremity  Location details: right leg  Anesthesia: local infiltration    Anesthesia:  Local Anesthetic: lidocaine 1% without epinephrine    Patient sedated: no  Scalpel size: 11  Incision type: single straight  Complexity: complex  Drainage: pus and bloody  Drainage amount: moderate  Wound treatment: deloculation and wound packed  Packing material: 1/4 in gauze  Patient tolerance: Patient tolerated the procedure well with no immediate complications        Labs Reviewed   POCT URINE PREGNANCY       Result Value    POC Preg Test, Ur Negative       Acceptable Yes            Imaging Results    None          Medications   LIDOcaine (PF) 10 mg/ml (1%) injection 100 mg (100 mg Infiltration Given by Other 9/16/24 0900)     Medical Decision Making  Patient presents with concern of abscess to right inner thigh.  History of hidradenitis.  Afebrile with vitals WNL.  Patient in no distress on exam.    DDx:  Including but not limited to hidradenitis support via, wound check, abscess, cellulitis    Amount and/or Complexity of Data Reviewed  Labs: ordered.    Risk  Prescription drug management.               ED Course as of 09/16/24 0956   Mon Sep 16, 2024   0855 BP: 118/79 [KS]  "  0855 Temp: 98.2 °F (36.8 °C) [KS]   0855 Pulse: 80 [KS]   0855 Resp: 18 [KS]   0855 SpO2: 99 % [KS]   0855 Vitals unremarkable. [KS]   0855 Lengthy discussion was made with patient regarding her current abscess and history of hidradenitis.  She states she does not wish to take her doxycycline because "never works".  Despite the risk of developing worsening scar tissue, she does wish to proceed with I&D today. [KS]   0909 POCT urine pregnancy  Negative [KS]   0954 Bedside I&D was performed.  Packing and sterile dressing was placed.  Prescription for Bactrim.  Encouraged have close PCP/dermatology/general surgery follow-up for continued management of her hidradenitis.  Instructed to return to ED in 48 hours for wound check and packing removal.  ED return precautions were discussed.  Patient states understanding and agrees with plan [KS]      ED Course User Index  [KS] Luis Miguel Luciano PA-C                             Clinical Impression:  Final diagnoses:  [Z87.2] History of hidradenitis suppurativa  [L02.91] Abscess (Primary)          ED Disposition Condition    Discharge Stable          ED Prescriptions       Medication Sig Dispense Start Date End Date Auth. Provider    sulfamethoxazole-trimethoprim 800-160mg (BACTRIM DS) 800-160 mg Tab Take 1 tablet by mouth 2 (two) times daily. for 7 days 14 tablet 9/16/2024 9/23/2024 Luis Miguel Luciano PA-C          Follow-up Information       Follow up With Specialties Details Why Contact Info    Yuniel Reid MD Neonatology, Pediatrics   [address] [place] [id number] [phone]               Luis Miguel Luciano PA-C  09/16/24 0956    "

## 2024-09-16 NOTE — Clinical Note
Cory Crawley accompanied  their family member to the emergency department on 9/16/2024. They may return to school on 09/17/2024.      If you have any questions or concerns, please don't hesitate to call.      Luis Miguel Luciano PA-C

## 2024-09-16 NOTE — Clinical Note
Cory Crawley accompanied  their caregiver to the emergency department on 9/16/2024. They may return to school on 09/17/2024.      If you have any questions or concerns, please don't hesitate to call.      Luis Miguel Luciano PA-C

## 2024-09-16 NOTE — Clinical Note
"Ana"Minesh Sinha was seen and treated in our emergency department on 9/16/2024.  She may return to work on 09/18/2024.       If you have any questions or concerns, please don't hesitate to call.      Luis Miguel Luciano PA-C"

## 2024-09-16 NOTE — DISCHARGE INSTRUCTIONS

## 2025-04-12 ENCOUNTER — HOSPITAL ENCOUNTER (EMERGENCY)
Facility: HOSPITAL | Age: 20
Discharge: HOME | End: 2025-04-12
Attending: EMERGENCY MEDICINE
Payer: MEDICAID

## 2025-04-12 VITALS
HEIGHT: 62 IN | SYSTOLIC BLOOD PRESSURE: 124 MMHG | BODY MASS INDEX: 28.52 KG/M2 | OXYGEN SATURATION: 100 % | RESPIRATION RATE: 16 BRPM | DIASTOLIC BLOOD PRESSURE: 78 MMHG | TEMPERATURE: 98 F | HEART RATE: 88 BPM | WEIGHT: 155 LBS

## 2025-04-12 DIAGNOSIS — R82.998 LEUKOCYTES IN URINE: ICD-10-CM

## 2025-04-12 DIAGNOSIS — O20.0 THREATENED MISCARRIAGE IN EARLY PREGNANCY: Primary | ICD-10-CM

## 2025-04-12 LAB
A1 AG RBC QL: NORMAL
ABO GROUP BLD: NORMAL
ABSOLUTE EOSINOPHIL (OHS): 0.1 K/UL
ABSOLUTE MONOCYTE (OHS): 0.67 K/UL (ref 0.3–1)
ABSOLUTE NEUTROPHIL COUNT (OHS): 3.5 K/UL (ref 1.8–7.7)
ALBUMIN SERPL BCP-MCNC: 4.1 G/DL (ref 3.5–5.2)
ALP SERPL-CCNC: 94 UNIT/L (ref 40–150)
ALT SERPL W/O P-5'-P-CCNC: 12 UNIT/L (ref 10–44)
ANION GAP (OHS): 12 MMOL/L (ref 8–16)
AST SERPL-CCNC: 19 UNIT/L (ref 11–45)
B-HCG UR QL: POSITIVE
BACTERIA #/AREA URNS AUTO: ABNORMAL /HPF
BASOPHILS # BLD AUTO: 0.04 K/UL
BASOPHILS NFR BLD AUTO: 0.5 %
BILIRUB SERPL-MCNC: 0.4 MG/DL (ref 0.1–1)
BILIRUB UR QL STRIP.AUTO: NEGATIVE
BUN SERPL-MCNC: 10 MG/DL (ref 6–20)
CALCIUM SERPL-MCNC: 9 MG/DL (ref 8.7–10.5)
CHLORIDE SERPL-SCNC: 106 MMOL/L (ref 95–110)
CLARITY UR: ABNORMAL
CO2 SERPL-SCNC: 21 MMOL/L (ref 23–29)
COLOR UR AUTO: YELLOW
CREAT SERPL-MCNC: 0.7 MG/DL (ref 0.5–1.4)
CTP QC/QA: YES
ERYTHROCYTE [DISTWIDTH] IN BLOOD BY AUTOMATED COUNT: 14.3 % (ref 11.5–14.5)
GFR SERPLBLD CREATININE-BSD FMLA CKD-EPI: >60 ML/MIN/1.73/M2
GLUCOSE SERPL-MCNC: 97 MG/DL (ref 70–110)
GLUCOSE UR QL STRIP: NEGATIVE
HCG INTACT+B SERPL-ACNC: NORMAL MIU/ML
HCT VFR BLD AUTO: 38.6 % (ref 37–48.5)
HGB BLD-MCNC: 12.9 GM/DL (ref 12–16)
HGB UR QL STRIP: ABNORMAL
HYALINE CASTS UR QL AUTO: 0 /LPF (ref 0–1)
IMM GRANULOCYTES # BLD AUTO: 0.02 K/UL (ref 0–0.04)
IMM GRANULOCYTES NFR BLD AUTO: 0.3 % (ref 0–0.5)
KETONES UR QL STRIP: ABNORMAL
LEUKOCYTE ESTERASE UR QL STRIP: ABNORMAL
LYMPHOCYTES # BLD AUTO: 3.1 K/UL (ref 1–4.8)
MCH RBC QN AUTO: 29.3 PG (ref 27–31)
MCHC RBC AUTO-ENTMCNC: 33.4 G/DL (ref 32–36)
MCV RBC AUTO: 88 FL (ref 82–98)
MICROSCOPIC COMMENT: ABNORMAL
NITRITE UR QL STRIP: NEGATIVE
NUCLEATED RBC (/100WBC) (OHS): 0 /100 WBC
PH UR STRIP: 7 [PH]
PLATELET # BLD AUTO: 264 K/UL (ref 150–450)
PMV BLD AUTO: 11.2 FL (ref 9.2–12.9)
POTASSIUM SERPL-SCNC: 3.9 MMOL/L (ref 3.5–5.1)
PROT SERPL-MCNC: 8.5 GM/DL (ref 6–8.4)
PROT UR QL STRIP: ABNORMAL
RBC # BLD AUTO: 4.41 M/UL (ref 4–5.4)
RBC #/AREA URNS AUTO: >100 /HPF (ref 0–4)
RELATIVE EOSINOPHIL (OHS): 1.3 %
RELATIVE LYMPHOCYTE (OHS): 41.7 % (ref 18–48)
RELATIVE MONOCYTE (OHS): 9 % (ref 4–15)
RELATIVE NEUTROPHIL (OHS): 47.2 % (ref 38–73)
RH BLD: NORMAL
SODIUM SERPL-SCNC: 139 MMOL/L (ref 136–145)
SP GR UR STRIP: >=1.03
UROBILINOGEN UR STRIP-ACNC: ABNORMAL EU/DL
WBC # BLD AUTO: 7.43 K/UL (ref 3.9–12.7)
WBC #/AREA URNS AUTO: 8 /HPF (ref 0–5)

## 2025-04-12 PROCEDURE — 81003 URINALYSIS AUTO W/O SCOPE: CPT | Performed by: PHYSICIAN ASSISTANT

## 2025-04-12 PROCEDURE — 85025 COMPLETE CBC W/AUTO DIFF WBC: CPT | Performed by: PHYSICIAN ASSISTANT

## 2025-04-12 PROCEDURE — 25000003 PHARM REV CODE 250: Performed by: PHYSICIAN ASSISTANT

## 2025-04-12 PROCEDURE — 86905 BLOOD TYPING RBC ANTIGENS: CPT | Performed by: PHYSICIAN ASSISTANT

## 2025-04-12 PROCEDURE — 96360 HYDRATION IV INFUSION INIT: CPT

## 2025-04-12 PROCEDURE — 99284 EMERGENCY DEPT VISIT MOD MDM: CPT | Mod: 25

## 2025-04-12 PROCEDURE — 86901 BLOOD TYPING SEROLOGIC RH(D): CPT | Performed by: PHYSICIAN ASSISTANT

## 2025-04-12 PROCEDURE — 80053 COMPREHEN METABOLIC PANEL: CPT | Performed by: PHYSICIAN ASSISTANT

## 2025-04-12 PROCEDURE — 84702 CHORIONIC GONADOTROPIN TEST: CPT | Performed by: PHYSICIAN ASSISTANT

## 2025-04-12 PROCEDURE — 81025 URINE PREGNANCY TEST: CPT | Performed by: PHYSICIAN ASSISTANT

## 2025-04-12 RX ORDER — CEPHALEXIN 500 MG/1
500 CAPSULE ORAL EVERY 12 HOURS
Qty: 14 CAPSULE | Refills: 0 | Status: SHIPPED | OUTPATIENT
Start: 2025-04-12 | End: 2025-04-19

## 2025-04-12 RX ADMIN — SODIUM CHLORIDE 1000 ML: 9 INJECTION, SOLUTION INTRAVENOUS at 08:04

## 2025-04-13 NOTE — ED PROVIDER NOTES
Encounter Date: 2025    SCRIBE #1 NOTE: I, Glory Smith, am scribing for, and in the presence of,  Vaibhav Tran PA-C. I have scribed the following portions of the note - Other sections scribed: HPI/ROS.       History     Chief Complaint   Patient presents with    Vaginal Bleeding     Pt presents to the ED for vaginal bleeding that started this morning. Pt reports she is currently 6 weeks pregnant, . Pt reports 2/10 cramping and lower back pain. Pt states the blood is mainly when she wipes but has been consistent all day.      Patient is a 19 y.o. female, 6 weeks pregnant, , with a PMHx of Anxiety, Hidradenitis suppurativa, who presents to the ED accompanied by boyfriend with vaginal bleeding onset this morning. Pt hasn't soaked through any pads. She changed her underwear. Patient reports lower abdominal cramping onset earlier today, that has currently resolved. Pt had 1st OB appointment yesterday (Dr. Radha Gonzalez), was prescribed prenatal vitamins and promethazine for nausea. No other exacerbating or alleviating factors. Denies abdominal pain, lightheadedness, dizziness or other associated symptoms.         The history is provided by the patient.     Review of patient's allergies indicates:   Allergen Reactions    Nut - unspecified Anaphylaxis    Peanut Anaphylaxis    Iodine     Shrimp      Past Medical History:   Diagnosis Date    Anxiety disorder, unspecified     Asthma     Bronchitis     Hidradenitis suppurativa      Past Surgical History:   Procedure Laterality Date    orif leg       Family History   Problem Relation Name Age of Onset    No Known Problems Mother      Hypertension Father       Social History[1]  Review of Systems   Constitutional:  Negative for chills and fever.   HENT:  Negative for congestion, rhinorrhea and sore throat.    Eyes:  Negative for visual disturbance.   Respiratory:  Negative for cough and shortness of breath.    Cardiovascular:  Negative for chest pain.    Gastrointestinal:  Positive for abdominal pain (lower abdominal cramping (resolved)). Negative for diarrhea, nausea and vomiting.   Genitourinary:  Positive for vaginal bleeding. Negative for dysuria, frequency and hematuria.   Musculoskeletal:  Negative for back pain.   Skin:  Negative for rash.   Neurological:  Negative for dizziness, weakness and headaches.       Physical Exam     Initial Vitals [04/12/25 1940]   BP Pulse Resp Temp SpO2   134/84 (!) 112 16 98.7 °F (37.1 °C) 99 %      MAP       --         Physical Exam    Nursing note and vitals reviewed.  Constitutional: She appears well-developed and well-nourished. She is not diaphoretic. No distress.   HENT:   Head: Normocephalic and atraumatic.   Nose: Nose normal.   Eyes: Conjunctivae and EOM are normal. Right eye exhibits no discharge. Left eye exhibits no discharge.   Neck: No tracheal deviation present. No JVD present.   Normal range of motion.  Cardiovascular:  Normal rate and regular rhythm.           Pulmonary/Chest: Effort normal. No accessory muscle usage or stridor. No tachypnea. No respiratory distress.   Abdominal: Abdomen is soft. She exhibits no distension. There is no abdominal tenderness.   No right CVA tenderness.  No left CVA tenderness. There is no rigidity, no rebound, no guarding, no tenderness at McBurney's point and negative Burrell's sign.   Musculoskeletal:         General: Normal range of motion.      Cervical back: Normal range of motion.     Neurological: She is alert and oriented to person, place, and time. She displays no tremor. She displays no seizure activity. Coordination and gait normal.   Skin: Skin is warm and dry. No rash noted.         ED Course   Procedures  Labs Reviewed   URINALYSIS, REFLEX TO URINE CULTURE - Abnormal       Result Value    Color, UA Yellow      Appearance, UA Hazy (*)     pH, UA 7.0      Spec Grav UA >=1.030 (*)     Protein, UA 1+ (*)     Glucose, UA Negative      Ketones, UA Trace (*)     Bilirubin,  UA Negative      Blood, UA 3+ (*)     Nitrites, UA Negative      Urobilinogen, UA 2.0-3.0 (*)     Leukocyte Esterase, UA 1+ (*)    COMPREHENSIVE METABOLIC PANEL - Abnormal    Sodium 139      Potassium 3.9      Chloride 106      CO2 21 (*)     Glucose 97      BUN 10      Creatinine 0.7      Calcium 9.0      Protein Total 8.5 (*)     Albumin 4.1      Bilirubin Total 0.4      ALP 94      AST 19      ALT 12      Anion Gap 12      eGFR >60      Narrative:     Specimen slightly hemolyzed.   URINALYSIS MICROSCOPIC - Abnormal    RBC, UA >100 (*)     WBC, UA 8 (*)     Bacteria, UA Occasional      Hyaline Casts, UA 0      Microscopic Comment       POCT URINE PREGNANCY - Abnormal    POC Preg Test, Ur Positive (*)      Acceptable Yes     CBC WITH DIFFERENTIAL - Normal    WBC 7.43      RBC 4.41      HGB 12.9      HCT 38.6      MCV 88      MCH 29.3      MCHC 33.4      RDW 14.3      Platelet Count 264      MPV 11.2      Nucleated RBC 0      Neut % 47.2      Lymph % 41.7      Mono % 9.0      Eos % 1.3      Basophil % 0.5      Imm Grans % 0.3      Neut # 3.50      Lymph # 3.10      Mono # 0.67      Eos # 0.10      Baso # 0.04      Imm Grans # 0.02     CBC W/ AUTO DIFFERENTIAL    Narrative:     The following orders were created for panel order CBC Auto Differential.  Procedure                               Abnormality         Status                     ---------                               -----------         ------                     CBC with Differential[[id number]]       Normal              Final result                 Please view results for these tests on the individual orders.   HCG, QUANTITATIVE    Beta HCG Quant 18,317.09     GREY TOP URINE HOLD   GROUP & RH    ABO A      Rh Type POS     A1 TYPING    A1 Typing NEG            Imaging Results              US OB <14 Wks TransAbd & TransVag, Single Gestation (XPD) (Final result)  Result time 04/12/25 21:00:00   Procedure changed from US OB Transvaginal     Final  result by Georgia Shi MD (04/12/25 21:00:00)                   Impression:      Very early IUP with presumed fetal pole too small to associated with a gestational age.  Consider serial beta HCG and ultrasound as needed.      Electronically signed by: Georgia Shi  Date:    04/12/2025  Time:    21:00               Narrative:    EXAMINATION:  ULTRASOUND OBSTETRICAL ULTRASOUND LESS THAN 14 WEEKS WITH TRANSVAGINAL    CLINICAL HISTORY:  EP? Misacarriage?;    TECHNIQUE:  Real-time ultrasound obstetrical ultrasound less than 14 weeks was performed transabdominally and  transvaginally.    COMPARISON:  None.    FINDINGS:  The uterus measures 7.3 x 4.3 x 5.3 cm. There are no uterine masses. A fetal pole, yolk sac, and gestational sac are all visualized.  The fetal pole has a crown-rump length of 1.6 mm, which is too small to correlate with gestational age.    The right ovary measures 3.4 x 2.9 x 2.3 cm. The left ovary measures 2.9 x 2.3 x 2.1 cm.    No free fluid is seen in the posterior cul-de-sac.                                       Medications   sodium chloride 0.9% bolus 1,000 mL 1,000 mL (0 mLs Intravenous Stopped 4/12/25 2132)     Medical Decision Making    This is an emergent evaluation of a 19 y.o. female, gravid, presenting to the ED for vaginal bleeding. Patient is non-toxic appearing and in no acute distress. Hemodynamically stable at this time. Will initiate EP workup while monitoring patient and treating symptoms.     Transvaginal US shows presumed intrauterine gestational sac, but no definitive evidence of other fetal components at this time; technically pregnancy of unknown location and cannot exclude possibility of EP/heterotopic pregnancy but this is felt to be very unlikely at this time. No prior beta-hCG available for comparison. Rh (+). No severe anemia or significant metabolic/eletrolyte disturbance. UA findings will be treated empirically in the setting of pregnancy. Given the above, I have  also considered but doubt pyelonephritis, ovarian torsion, ovarian cyst rupture, appendicitis, and PID.    Patient controlled in ED. Remains hemodynamically stable and overall well appearing. Instructed to have prompt follow up with OBGYN for reevaluation and management of her symptoms.    I discussed with the patient the diagnosis, treatment plan, indications for return to the emergency department, and for expected follow-up. The patient verbalized an understanding. The patient is asked if there are any questions or concerns. We discuss the case, until all issues are addressed to the patient's satisfaction. Patient understands and is agreeable to the plan.       Amount and/or Complexity of Data Reviewed  Labs: ordered.  Radiology: ordered.    Risk  Prescription drug management.            Scribe Attestation:   Scribe #1: I performed the above scribed service and the documentation accurately describes the services I performed. I attest to the accuracy of the note.                             I, Vaibhav Tran PA-C, personally performed the services described in this documentation. All medical record entries made by the scribe were at my direction and in my presence. I have reviewed the chart and agree that the record reflects my personal performance and is accurate and complete.      DISCLAIMER: This note was prepared with [employer] voice recognition transcription software. Garbled syntax, mangled pronouns, and other bizarre constructions may be attributed to that software system.      Clinical Impression:  Final diagnoses:  [O20.0] Threatened miscarriage in early pregnancy (Primary)  [R82.998] Leukocytes in urine          ED Disposition Condition    Discharge Stable          ED Prescriptions       Medication Sig Dispense Start Date End Date Auth. Provider    cephALEXin (KEFLEX) 500 MG capsule Take 1 capsule (500 mg total) by mouth every 12 (twelve) hours. for 7 days 14 capsule 4/12/2025 4/19/2025 Vaibhav Tran,  CHIVO          Follow-up Information       Follow up With Specialties Details Why Contact Info    [facility] Obstetrics and Gynecology Schedule an appointment as soon as possible for a visit in 1 day For further evaluation, For more definitive management of your symptoms [address] [facility] [place] [id number] [phone]    [facility] Emergency Dept Emergency Medicine Go to  If symptoms worsen or new symptoms develop [address] [facility] [place] [id number] [phone]                 [1]   Social History  Tobacco Use    Smoking status: Never     Passive exposure: Yes    Smokeless tobacco: Never   Substance Use Topics    Alcohol use: No    Drug use: No        Vaibhav Tran PA-C  04/12/25 7792

## 2025-04-13 NOTE — DISCHARGE INSTRUCTIONS
Thank you for coming to our Emergency Department today. It is important to remember that some problems or medical conditions are difficult to diagnose and may not be found or addressed during your Emergency Department visit.  These conditions often start with non-specific symptoms and can only be diagnosed on follow up visits with your primary care physician or specialist when the symptoms continue or change. Please remember that all medical conditions can change, and we cannot predict how you will be feeling tomorrow or the next day. Return to the ER with any questions/concerns, new/concerning symptoms, worsening or failure to improve.       Be sure to follow up with your primary care doctor and review all labs/imaging/tests that were performed during your ER visit with them. It is very common for us to identify non-emergent incidental findings which must be followed up with your primary care physician.  Some labs/imaging/tests may be outside of the normal range, and require non-emergent follow-up and/or further investigation/treatment/procedures/testing to help diagnose/exclude/prevent complications or other potentially serious medical conditions. Some abnormalities may not have been discussed or addressed during your ER visit.     An ER visit does not replace a primary care visit, and many screening tests or follow-up tests cannot be ordered by an ER doctor or performed by the ER. Some tests may even require pre-approval.    If you do not have a primary care doctor, you may contact the one listed on your discharge paperwork or you may also call the [facility] Appointment Desk at [phone] , or [address] [place] [address] at  [phone] to schedule an appointment, or establish care with a primary care doctor or even a specialist and to obtain information about local resources. It is important to your health that you have a primary care doctor.    Please take all medications as directed. We have done our best to select  a medication for you that will treat your condition however, all medications may potentially have side-effects and it is impossible to predict which medications may give you side-effects or what those side-effects (if any) those medications may give you.  If you feel that you are having a negative effect or side-effect of any medication you should stop taking those medications immediately and seek medical attention. If you feel that you are having a life-threatening reaction call 911.        Do not drive, swim, climb to height, take a bath, operate heavy machinery, drink alcohol or take potentially sedating medications, sign any legal documents or make any important decisions for 24 hours if you have received any pain medications, sedatives or mood altering drugs during your ER visit or within 24 hours of taking them if they have been prescribed to you.     You can find additional resources for Dentists, hearing aids, durable medical equipment, low cost pharmacies and other resources at [email or web address]

## 2025-04-18 ENCOUNTER — HOSPITAL ENCOUNTER (EMERGENCY)
Facility: HOSPITAL | Age: 20
Discharge: HOME | End: 2025-04-18
Attending: EMERGENCY MEDICINE
Payer: MEDICAID

## 2025-04-18 VITALS
OXYGEN SATURATION: 100 % | DIASTOLIC BLOOD PRESSURE: 67 MMHG | SYSTOLIC BLOOD PRESSURE: 125 MMHG | RESPIRATION RATE: 18 BRPM | WEIGHT: 155 LBS | HEART RATE: 85 BPM | BODY MASS INDEX: 28.35 KG/M2 | TEMPERATURE: 99 F

## 2025-04-18 DIAGNOSIS — Z3A.01 LESS THAN 8 WEEKS GESTATION OF PREGNANCY: ICD-10-CM

## 2025-04-18 DIAGNOSIS — K20.90 ESOPHAGITIS: Primary | ICD-10-CM

## 2025-04-18 PROCEDURE — 25000003 PHARM REV CODE 250: Performed by: PHYSICIAN ASSISTANT

## 2025-04-18 PROCEDURE — 99284 EMERGENCY DEPT VISIT MOD MDM: CPT | Mod: 25

## 2025-04-18 RX ORDER — PRENATAL WITH FERROUS FUM AND FOLIC ACID 3080; 920; 120; 400; 22; 1.84; 3; 20; 10; 1; 12; 200; 27; 25; 2 [IU]/1; [IU]/1; MG/1; [IU]/1; MG/1; MG/1; MG/1; MG/1; MG/1; MG/1; UG/1; MG/1; MG/1; MG/1; MG/1
1 TABLET ORAL
COMMUNITY
Start: 2025-04-11

## 2025-04-18 RX ORDER — ACETAMINOPHEN 500 MG
1000 TABLET ORAL
Status: DISCONTINUED | OUTPATIENT
Start: 2025-04-18 | End: 2025-04-18

## 2025-04-18 RX ORDER — ONDANSETRON 8 MG/1
8 TABLET, ORALLY DISINTEGRATING ORAL
Status: COMPLETED | OUTPATIENT
Start: 2025-04-18 | End: 2025-04-18

## 2025-04-18 RX ORDER — ONDANSETRON 4 MG/1
8 TABLET, FILM COATED ORAL EVERY 6 HOURS PRN
Qty: 30 TABLET | Refills: 0 | Status: SHIPPED | OUTPATIENT
Start: 2025-04-18 | End: 2025-05-18

## 2025-04-18 RX ORDER — ALUMINUM HYDROXIDE, MAGNESIUM HYDROXIDE, AND SIMETHICONE 1200; 120; 1200 MG/30ML; MG/30ML; MG/30ML
30 SUSPENSION ORAL
Status: COMPLETED | OUTPATIENT
Start: 2025-04-18 | End: 2025-04-18

## 2025-04-18 RX ADMIN — ONDANSETRON 8 MG: 8 TABLET, ORALLY DISINTEGRATING ORAL at 03:04

## 2025-04-18 RX ADMIN — ALUMINUM HYDROXIDE, MAGNESIUM HYDROXIDE, AND SIMETHICONE 30 ML: 200; 200; 20 SUSPENSION ORAL at 03:04

## 2025-04-18 NOTE — DISCHARGE INSTRUCTIONS
Thank you for coming to our Emergency Department today. It is important to remember that some problems or medical conditions are difficult to diagnose and may not be found or addressed during your Emergency Department visit.  These conditions often start with non-specific symptoms and can only be diagnosed on follow up visits with your primary care physician or specialist when the symptoms continue or change. Please remember that all medical conditions can change, and we cannot predict how you will be feeling tomorrow or the next day. Return to the ER with any questions/concerns, new/concerning symptoms, worsening or failure to improve.       Be sure to follow up with your primary care doctor and review all labs/imaging/tests that were performed during your ER visit with them. It is very common for us to identify non-emergent incidental findings which must be followed up with your primary care physician.  Some labs/imaging/tests may be outside of the normal range, and require non-emergent follow-up and/or further investigation/treatment/procedures/testing to help diagnose/exclude/prevent complications or other potentially serious medical conditions. Some abnormalities may not have been discussed or addressed during your ER visit.     An ER visit does not replace a primary care visit, and many screening tests or follow-up tests cannot be ordered by an ER doctor or performed by the ER. Some tests may even require pre-approval.    If you do not have a primary care doctor, you may contact the one listed on your discharge paperwork or you may also call the [facility] Appointment Desk at [phone] , or [address] [place] [address] at  [phone] to schedule an appointment, or establish care with a primary care doctor or even a specialist and to obtain information about local resources. It is important to your health that you have a primary care doctor.    Please take all medications as directed. We have done our best to select  a medication for you that will treat your condition however, all medications may potentially have side-effects and it is impossible to predict which medications may give you side-effects or what those side-effects (if any) those medications may give you.  If you feel that you are having a negative effect or side-effect of any medication you should stop taking those medications immediately and seek medical attention. If you feel that you are having a life-threatening reaction call 911.        Do not drive, swim, climb to height, take a bath, operate heavy machinery, drink alcohol or take potentially sedating medications, sign any legal documents or make any important decisions for 24 hours if you have received any pain medications, sedatives or mood altering drugs during your ER visit or within 24 hours of taking them if they have been prescribed to you.     You can find additional resources for Dentists, hearing aids, durable medical equipment, low cost pharmacies and other resources at [email or web address]

## 2025-04-18 NOTE — ED PROVIDER NOTES
"Encounter Date: 4/18/2025       History     Chief Complaint   Patient presents with    Foreign Body In Throat     Pt reports feeling as if something is "stuck in her throat and moving up and down" since this morning. Pt reports being 6 weeks pregnant and having frequent nausea. Pt reports when she vomits, she feels pain in her throat since this morning. Pt denies shortness of breath or trouble breathing. Pt denies abd pain or vaginal bleeding.     19-year-old female, 7 weeks gravid, presents to the emergency department with pharyngitis following nausea and vomiting.  Symptoms have been present for the duration of pregnancy and she will often have irritation of her throat falling episodes of vomiting but they typically go away on their own.  However, this morning the irritation did not resolve, prompting visit to the ED. denies bloody emesis, fever, chest pain, shortness of breath.  Denies abdominal pain, vaginal bleeding, and other pregnancy specific concerns.  Has been taking Phenergan for nausea with temporary relief although none taken today.  Denies choking.    The history is provided by the patient.     Review of patient's allergies indicates:   Allergen Reactions    Nut - unspecified Anaphylaxis    Peanut Anaphylaxis    Iodine     Shrimp      Past Medical History:   Diagnosis Date    Anxiety disorder, unspecified     Asthma     Bronchitis     Hidradenitis suppurativa      Past Surgical History:   Procedure Laterality Date    orif leg       Family History   Problem Relation Name Age of Onset    No Known Problems Mother      Hypertension Father       Social History[1]  Review of Systems   Constitutional:  Negative for fever.   HENT:  Positive for sore throat. Negative for congestion and trouble swallowing.    Respiratory:  Negative for cough and shortness of breath.    Cardiovascular:  Negative for chest pain.   Gastrointestinal:  Positive for nausea and vomiting. Negative for abdominal pain, constipation and " diarrhea.   Genitourinary:  Negative for dysuria, flank pain, frequency and urgency.   Musculoskeletal:  Negative for back pain.   Skin:  Negative for rash.   Neurological:  Negative for headaches.   All other systems reviewed and are negative.      Physical Exam     Initial Vitals [04/18/25 1351]   BP Pulse Resp Temp SpO2   (!) 131/92 110 18 98.3 °F (36.8 °C) 100 %      MAP       --         Physical Exam    Nursing note and vitals reviewed.  Constitutional: She appears well-developed and well-nourished. She is not diaphoretic. No distress.   HENT:   Head: Normocephalic and atraumatic.   Right Ear: External ear normal.   Left Ear: External ear normal.   Nose: Nose normal. Mouth/Throat: Oropharynx is clear and moist.   Eyes: Conjunctivae and EOM are normal. Right eye exhibits no discharge. Left eye exhibits no discharge.   Neck: No tracheal deviation present. No JVD present.   Normal range of motion.  Cardiovascular:  Normal rate and regular rhythm.           Pulmonary/Chest: Effort normal. No accessory muscle usage or stridor. No tachypnea. No respiratory distress.   Abdominal: Abdomen is soft. She exhibits no distension. There is no abdominal tenderness.   No right CVA tenderness.  No left CVA tenderness. There is no rigidity, no rebound, no guarding, no tenderness at McBurney's point and negative Burrell's sign.   Musculoskeletal:         General: Normal range of motion.      Cervical back: Normal range of motion.     Neurological: She is alert and oriented to person, place, and time. She displays no tremor. She displays no seizure activity. Coordination and gait normal.   Skin: Skin is warm, dry and intact. No rash noted. No pallor.         ED Course   Procedures  Labs Reviewed - No data to display       Imaging Results              X-Ray Neck Soft Tissue (Final result)  Result time 04/18/25 18:08:21      Final result by Navya Carrizales MD (04/18/25 18:08:21)                   Impression:      No acute  abnormalities identified.      Electronically signed by: Navya Carrizales MD  Date:    04/18/2025  Time:    18:08               Narrative:    EXAMINATION:  XR NECK SOFT TISSUE    CLINICAL HISTORY:  Foreign body sensation, throat    TECHNIQUE:  AP and lateral soft tissue views the neck were performed.    COMPARISON:  None.    FINDINGS:  Soft tissues of the neck show no significant abnormalities.  No obvious radiopaque retained ingested foreign body is appreciated.  No prevertebral soft tissue swelling.  Epiglottis is normal in thickness.  Cervical spine shows no significant or acute abnormalities.  Airway appears patent.  Partially visualized lung apices are clear.                                       Medications   aluminum-magnesium hydroxide-simethicone 200-200-20 mg/5 mL suspension 30 mL (30 mLs Oral Given 4/18/25 1554)   ondansetron disintegrating tablet 8 mg (8 mg Oral Given 4/18/25 1554)     Medical Decision Making  Approximately 7 weeks gravid.  No pregnancy specific complaints at this time, including vaginal bleeding and abdominal pain.  Hemodynamically stable and afebrile.  Feels better after Maalox and Zofran.  Tolerating p.o. without difficulty.  Presentation consistent with very mild esophagitis, likely related to her morning sickness.  No symptoms of upper GI bleed.  No evidence of perforation/free air on x-ray.  No foreign body.  Feels comfortable going home to follow up with OBGYN.    Amount and/or Complexity of Data Reviewed  Radiology: ordered.    Risk  OTC drugs.  Prescription drug management.                                      Clinical Impression:  Final diagnoses:  [K20.90] Esophagitis (Primary)  [Z3A.01] Less than 8 weeks gestation of pregnancy          ED Disposition Condition    Discharge Good          ED Prescriptions       Medication Sig Dispense Start Date End Date Auth. Provider    aluminum-magnesium hydroxide 200-200 mg/5 mL suspension Take 10 mLs by mouth every 6 (six) hours as needed  for Indigestion. 60 mL 4/18/2025 4/18/2026 Vaibhav Tran PA-C    ondansetron (ZOFRAN) 4 MG tablet Take 2 tablets (8 mg total) by mouth every 6 (six) hours as needed for Nausea. 30 tablet 4/18/2025 5/18/2025 Vaibhav Tran PA-C          Follow-up Information       Follow up With Specialties Details Why Contact Info    [facility]N Obstetrics and Gynecology Schedule an appointment as soon as possible for a visit in 1 day For further evaluation, For more definitive management of your symptoms [address] [facility] [place] [id number] [phone]    [facility] Emergency Dept Emergency Medicine Go to  If symptoms worsen or new symptoms develop [address] [facility] [place] [address]  [phone]                 [1]   Social History  Tobacco Use    Smoking status: Never     Passive exposure: Yes    Smokeless tobacco: Never   Substance Use Topics    Alcohol use: No    Drug use: No        Vaibhav Tran PA-C  04/18/25 1849

## 2025-04-18 NOTE — ED TRIAGE NOTES
"Ana Sinha, a 19 y.o. female, presents to ED via POV with complaints of feeling a foreign body in her throat. Reports frequent vomiting due to pregnancy nausea. States that since this AM, every time she vomits she feels her "throat burning".     "

## 2025-08-17 ENCOUNTER — HOSPITAL ENCOUNTER (EMERGENCY)
Facility: HOSPITAL | Age: 20
Discharge: HOME | End: 2025-08-17
Attending: EMERGENCY MEDICINE
Payer: MEDICAID

## 2025-08-17 VITALS
DIASTOLIC BLOOD PRESSURE: 74 MMHG | HEART RATE: 84 BPM | OXYGEN SATURATION: 99 % | BODY MASS INDEX: 31.09 KG/M2 | RESPIRATION RATE: 17 BRPM | TEMPERATURE: 99 F | WEIGHT: 170 LBS | SYSTOLIC BLOOD PRESSURE: 121 MMHG

## 2025-08-17 DIAGNOSIS — J06.9 VIRAL URI: ICD-10-CM

## 2025-08-17 DIAGNOSIS — Z34.92 SECOND TRIMESTER PREGNANCY: ICD-10-CM

## 2025-08-17 DIAGNOSIS — L02.412 ABSCESS OF LEFT AXILLA: Primary | ICD-10-CM

## 2025-08-17 LAB
BILIRUB UR QL STRIP.AUTO: NEGATIVE
CLARITY UR: CLEAR
COLOR UR AUTO: YELLOW
CTP QC/QA: YES
CTP QC/QA: YES
GLUCOSE UR QL STRIP: NEGATIVE
HGB UR QL STRIP: NEGATIVE
KETONES UR QL STRIP: NEGATIVE
LEUKOCYTE ESTERASE UR QL STRIP: NEGATIVE
NITRITE UR QL STRIP: NEGATIVE
PH UR STRIP: 7 [PH]
POC MOLECULAR INFLUENZA A AGN: NEGATIVE
POC MOLECULAR INFLUENZA B AGN: NEGATIVE
PROT UR QL STRIP: NEGATIVE
SARS-COV-2 RDRP RESP QL NAA+PROBE: NEGATIVE
SP GR UR STRIP: 1.01
UROBILINOGEN UR STRIP-ACNC: NEGATIVE EU/DL

## 2025-08-17 PROCEDURE — 87635 SARS-COV-2 COVID-19 AMP PRB: CPT

## 2025-08-17 PROCEDURE — 87502 INFLUENZA DNA AMP PROBE: CPT

## 2025-08-17 PROCEDURE — 99284 EMERGENCY DEPT VISIT MOD MDM: CPT

## 2025-08-17 PROCEDURE — 81003 URINALYSIS AUTO W/O SCOPE: CPT

## 2025-08-17 RX ORDER — CEPHALEXIN 500 MG/1
1000 CAPSULE ORAL 2 TIMES DAILY
Qty: 28 CAPSULE | Refills: 0 | Status: SHIPPED | OUTPATIENT
Start: 2025-08-17 | End: 2025-08-24

## 2025-08-17 RX ORDER — ACETAMINOPHEN 500 MG
500 TABLET ORAL EVERY 6 HOURS PRN
Qty: 20 TABLET | Refills: 0 | Status: SHIPPED | OUTPATIENT
Start: 2025-08-17

## 2025-08-18 LAB — HOLD SPECIMEN: NORMAL

## (undated) DEVICE — DRAPE PLASTIC U 60X72

## (undated) DEVICE — CONTAINER SPECIMEN STRL 4OZ

## (undated) DEVICE — TOURNIQUET HEMACLEAR LARGE

## (undated) DEVICE — TRAY MINOR ORTHO

## (undated) DEVICE — STOCKINET 4INX48

## (undated) DEVICE — SEE MEDLINE ITEM 146298

## (undated) DEVICE — SPONGE GAUZE 16PLY 4X4

## (undated) DEVICE — GAUZE SPONGE 4X4 12PLY

## (undated) DEVICE — ELECTRODE REM PLYHSV RETURN 9

## (undated) DEVICE — DRAPE C ARM 42 X 120 10/BX

## (undated) DEVICE — PAD CAST SPECIALIST STRL 4

## (undated) DEVICE — APPLICATOR CHLORAPREP ORN 26ML

## (undated) DEVICE — SOL IRR NACL .9% 3000ML

## (undated) DEVICE — SEE MEDLINE ITEM 152515

## (undated) DEVICE — DERMABOND SKIN ADHESIVE PROPEN

## (undated) DEVICE — DRAPE STERI U-SHAPED 47X51IN

## (undated) DEVICE — SEE MEDLINE ITEM 157150

## (undated) DEVICE — CLOSURE SKIN STERI STRIP 1/4X4